# Patient Record
Sex: FEMALE | Race: WHITE | Employment: FULL TIME | URBAN - METROPOLITAN AREA
[De-identification: names, ages, dates, MRNs, and addresses within clinical notes are randomized per-mention and may not be internally consistent; named-entity substitution may affect disease eponyms.]

---

## 2018-01-25 ENCOUNTER — OP HISTORICAL/CONVERTED ENCOUNTER (OUTPATIENT)
Dept: OTHER | Age: 38
End: 2018-01-25

## 2018-02-20 ENCOUNTER — OP HISTORICAL/CONVERTED ENCOUNTER (OUTPATIENT)
Dept: OTHER | Age: 38
End: 2018-02-20

## 2018-02-23 ENCOUNTER — OP HISTORICAL/CONVERTED ENCOUNTER (OUTPATIENT)
Dept: OTHER | Age: 38
End: 2018-02-23

## 2018-03-01 ENCOUNTER — OP HISTORICAL/CONVERTED ENCOUNTER (OUTPATIENT)
Dept: OTHER | Age: 38
End: 2018-03-01

## 2018-03-28 ENCOUNTER — OP HISTORICAL/CONVERTED ENCOUNTER (OUTPATIENT)
Dept: OTHER | Age: 38
End: 2018-03-28

## 2018-04-12 ENCOUNTER — OP HISTORICAL/CONVERTED ENCOUNTER (OUTPATIENT)
Dept: OTHER | Age: 38
End: 2018-04-12

## 2018-05-01 ENCOUNTER — OP HISTORICAL/CONVERTED ENCOUNTER (OUTPATIENT)
Dept: OTHER | Age: 38
End: 2018-05-01

## 2018-08-28 ENCOUNTER — OP HISTORICAL/CONVERTED ENCOUNTER (OUTPATIENT)
Dept: OTHER | Age: 38
End: 2018-08-28

## 2018-09-21 ENCOUNTER — ED HISTORICAL/CONVERTED ENCOUNTER (OUTPATIENT)
Dept: OTHER | Age: 38
End: 2018-09-21

## 2019-02-25 ENCOUNTER — OP HISTORICAL/CONVERTED ENCOUNTER (OUTPATIENT)
Dept: OTHER | Age: 39
End: 2019-02-25

## 2019-08-21 ENCOUNTER — OP HISTORICAL/CONVERTED ENCOUNTER (OUTPATIENT)
Dept: OTHER | Age: 39
End: 2019-08-21

## 2020-11-05 RX ORDER — THYROID, PORCINE 120 MG/1
TABLET ORAL
Qty: 90 TAB | Refills: 0 | OUTPATIENT
Start: 2020-11-05

## 2020-11-09 RX ORDER — LEVOTHYROXINE AND LIOTHYRONINE 76; 18 UG/1; UG/1
120 TABLET ORAL DAILY
Qty: 30 TAB | Refills: 0 | Status: SHIPPED | OUTPATIENT
Start: 2020-11-09 | End: 2020-11-23 | Stop reason: SDUPTHER

## 2020-11-20 PROBLEM — R60.9 EDEMA: Status: ACTIVE | Noted: 2020-11-20

## 2020-11-20 PROBLEM — E55.9 VITAMIN D DEFICIENCY: Status: ACTIVE | Noted: 2020-11-20

## 2020-11-20 PROBLEM — R59.0 CERVICAL LYMPHADENOPATHY: Status: ACTIVE | Noted: 2020-11-20

## 2020-11-20 PROBLEM — E66.9 OBESITY: Status: ACTIVE | Noted: 2020-11-20

## 2020-11-20 PROBLEM — J06.9 URI (UPPER RESPIRATORY INFECTION): Status: ACTIVE | Noted: 2020-11-20

## 2020-11-20 PROBLEM — E03.9 HYPOTHYROIDISM: Status: ACTIVE | Noted: 2020-11-20

## 2020-11-20 PROBLEM — F17.200 TOBACCO DEPENDENCE SYNDROME: Status: ACTIVE | Noted: 2020-11-20

## 2020-11-20 PROBLEM — M72.2 PLANTAR FASCIITIS: Status: ACTIVE | Noted: 2020-11-20

## 2020-11-20 PROBLEM — E04.9 GOITER: Status: ACTIVE | Noted: 2020-11-20

## 2020-11-20 PROBLEM — R53.83 FATIGUE: Status: ACTIVE | Noted: 2020-11-20

## 2020-11-20 PROBLEM — M25.50 MULTIPLE JOINT PAIN: Status: ACTIVE | Noted: 2020-11-20

## 2020-11-23 ENCOUNTER — OFFICE VISIT (OUTPATIENT)
Dept: INTERNAL MEDICINE CLINIC | Age: 40
End: 2020-11-23
Payer: COMMERCIAL

## 2020-11-23 VITALS
HEIGHT: 65 IN | OXYGEN SATURATION: 98 % | WEIGHT: 293 LBS | BODY MASS INDEX: 48.82 KG/M2 | DIASTOLIC BLOOD PRESSURE: 52 MMHG | SYSTOLIC BLOOD PRESSURE: 102 MMHG | RESPIRATION RATE: 18 BRPM | TEMPERATURE: 98.6 F | HEART RATE: 83 BPM

## 2020-11-23 DIAGNOSIS — E03.9 ACQUIRED HYPOTHYROIDISM: Primary | ICD-10-CM

## 2020-11-23 DIAGNOSIS — E66.3 OVERWEIGHT: ICD-10-CM

## 2020-11-23 DIAGNOSIS — R79.89 LOW VITAMIN D LEVEL: ICD-10-CM

## 2020-11-23 DIAGNOSIS — K21.9 GASTROESOPHAGEAL REFLUX DISEASE WITHOUT ESOPHAGITIS: ICD-10-CM

## 2020-11-23 PROCEDURE — 99213 OFFICE O/P EST LOW 20 MIN: CPT | Performed by: INTERNAL MEDICINE

## 2020-11-23 RX ORDER — METHOCARBAMOL 500 MG/1
1 TABLET, FILM COATED ORAL
COMMUNITY

## 2020-11-23 NOTE — PROGRESS NOTES
Candy Tee is a 45 y.o. female and presents with Medication Refill  Patient presents for follow-up not being seen in over a year she was advised to be more compliant she continues on [place] Thyroid 120 mg a day and Prevacid on rare occasions she still has some acid reflux or GERD and will try to lose weight diet exercise which we discussed she is down to 317 pounds she has lost 10 pounds since a year ago she will continue to try to lose labs drawn by the office phlebotomist for those studies below she had a nodule soft nontender movable right neck which she has had in the anterior sternocleidomastoid muscle area which felt to be lymph node sebaceous cyst or lipoma. We will review the old CT scan performed a year or so ago patient says she is not bothered by it it may have been, a little larger unclear- no other cervical adenopathy pain or discomfort  -Last labs from August last year reviewed cholesterol 165 sodium 140 potassium 3.6 creatinine 0.75 glucose 81 normal hepatocellular enzymes TSH 3.7 T4 0.65 vitamin D 14 patient presents for follow-up ambulating with a cane has some osteoarthritis doing well blood pressure 110/60 left arm history of lung carcinoma head neck cancer followed by oncology history of cervical spine disease spinal stenosis on narcotic analgesics tolerating narcotic analgesics well urine toxicology drug screen performed.   Patient also seen in the past by Dr. Tereza Lind for hyper esophagus endoscopy and evaluation for chronic cough which she still maintains but is not very bothersome he does not feel anything is changed weight is about the same he has discontinued cigarettes smoking and alcohol a number of years ago we reviewed his medications he has had a history of DVT in the left leg x2 and is on adjusted dose Coumadin at this time overall doing fairly well         Review of Systems  Constitutional: negative for fevers, chills, anorexia, weight loss and fatigue,no insomnia-overweight eyes:   negative for visual disturbance and irritation, eye discharge, eye pain. no eye redness. ENT:   negative for tinnitus, sore throat, nasal congestion, ear pain, hoarseness, hearing loss.,no snoring. Respiratory:  negative for cough, hemoptysis, shortness of breath, wheezing,  CV:   negative for chest pain, palpitations, lower extremity edema, shortness of breath while sitting, walking or at night  GI:   negative for nausea, vomiting, diarrhea, abdominal pain,melena,constipation. Noted GERD  Endo:               negative for polyuria, polydipsia, polyphagia, cold or heat intolerance,hair loss. Genitourinary: negative for frequency, dysuria and hematuria,urethral discharge,nocturia.straining while urination,urinary incontinence. Integument:  negative for rash and pruritus  Hematologic:  negative for easy bruising and gum/nose bleeding, enlarged nodes  Musculoskel: negative for myalgias, arthralgias, back pain, muscle weakness, joint pain, h/o fall,cramps,calf pain. Neurological:  negative for headaches, dizziness, vertigo, memory problems, gait and seizures loss of consciousness,no ataxia.   Behavl/Psych: negative for feelings of anxiety, depression, mood changes ,sadness    Past Medical History:   Diagnosis Date    Cervical lymphadenopathy 11/20/2020    Edema 11/20/2020    Fatigue 11/20/2020    Goiter 11/20/2020    Hypothyroidism 11/20/2020    Multiple joint pain 11/20/2020    Obesity 11/20/2020    Plantar fasciitis 11/20/2020    Tobacco dependence syndrome 11/20/2020    URI (upper respiratory infection) 11/20/2020    Vitamin D deficiency 11/20/2020     Past Surgical History:   Procedure Laterality Date    HX TONSILLECTOMY       Social History     Socioeconomic History    Marital status:      Spouse name: Not on file    Number of children: Not on file    Years of education: Not on file    Highest education level: Not on file   Tobacco Use    Smoking status: Former Smoker Packs/day: 0.50    Smokeless tobacco: Never Used   Substance and Sexual Activity    Alcohol use: Yes     Comment: Occasional     Family History   Problem Relation Age of Onset    Hypertension Mother     Elevated Lipids Mother      Current Outpatient Medications   Medication Sig Dispense Refill    methocarbamoL (ROBAXIN) 500 mg tablet Take 1 Tab by mouth two (2) times daily as needed.  [place] Thyroid 120 mg tab TAKE 1 TABLET BY MOUTH EVERY DAY 90 Tab 0     No Known Allergies    Objective:  Visit Vitals  BP (!) 102/52 (BP 1 Location: Right arm, BP Patient Position: Sitting)   Pulse 83   Temp 98.6 °F (37 °C) (Oral)   Resp 18   Ht 5' 5\" (1.651 m)   Wt 314 lb 6.4 oz (142.6 kg)   SpO2 98%   BMI 52.32 kg/m²       Physical Exam:   Constitutional: General Appearance: healthy-appearing and obese. Level of Distress: NAD. Ambulation: ambulating normally. Overweight  Psychiatric: Mental Status: normal mood and affect and active and alert. Orientation: to time, place, and person. no agitation. ,normal eye contact. normal insight  Head: Head: normocephalic and atraumatic. Eyes: Pupils: PERRLA. Sclerae: non-icteric. ENMT: No lesions on external ear, no hearing loss. No lesions on external nose, sinus tenderness, or nasal discharge. Lips, Teeth, and no mouth or lip ulcers   Neck: Neck: supple, trachea midline, and no masses. Lymph Nodes: no cervical LAD. Thyroid: no enlargement or nodules and non-tender. Lungs: Respiratory effort: no dyspnea. Auscultation: no wheezing, rales/crackles, or rhonchi and breath sounds normal and good air movement. Cardiovascular: Apical Impulse: not displaced. Heart Auscultation: normal S1 and S2; no murmurs, rubs, or gallops; and RRR. Neck vessels: no carotid bruits. Pulses including femoral / pedal: normal throughout. Abdomen: Bowel Sounds: normal. Inspection and Palpation: no tenderness, guarding, or masses and soft and non-distended. Liver: non-tender and no hepatomegaly.  Spleen: non-tender and no splenomegaly. Musculoskeletal[de-identified] Extremities: no edema or varicosities. Calf tenderness. Neurologic: Gait and Station: normal gait and station. Motor Strength normal right and left. Sensory and cerebellar intact. Skin: Inspection and palpation: no rash, lesions, or ulcer. No results found for this or any previous visit. Assessment/Plan:    ICD-10-CM ICD-9-CM    1. Acquired hypothyroidism  E03.9 244.9 TSH 3RD GENERATION      T4, FREE      CBC WITH AUTOMATED DIFF      METABOLIC PANEL, COMPREHENSIVE   2. Overweight  E66.3 278.02    3. Gastroesophageal reflux disease without esophagitis  K21.9 530.81    4. Low vitamin D level  R79.89 790.6      Orders Placed This Encounter    TSH 3RD GENERATION    T4, FREE    CBC WITH AUTOMATED DIFF    METABOLIC PANEL, COMPREHENSIVE    methocarbamoL (ROBAXIN) 500 mg tablet     Sig: Take 1 Tab by mouth two (2) times daily as needed. routine labs ordered, call if any problems    There are no Patient Instructions on file for this visit. Follow-up and Dispositions    · Return in about 6 months (around 5/23/2021).

## 2020-11-24 LAB
ALBUMIN SERPL-MCNC: 4.3 G/DL (ref 3.8–4.8)
ALBUMIN/GLOB SERPL: 1.5 {RATIO} (ref 1.2–2.2)
ALP SERPL-CCNC: 70 IU/L (ref 39–117)
ALT SERPL-CCNC: 12 IU/L (ref 0–32)
AST SERPL-CCNC: 23 IU/L (ref 0–40)
BASOPHILS # BLD AUTO: NORMAL 10*3/UL
BILIRUB SERPL-MCNC: 0.4 MG/DL (ref 0–1.2)
BUN SERPL-MCNC: 8 MG/DL (ref 6–20)
BUN/CREAT SERPL: 11 (ref 9–23)
CALCIUM SERPL-MCNC: 9.3 MG/DL (ref 8.7–10.2)
CHLORIDE SERPL-SCNC: 103 MMOL/L (ref 96–106)
CO2 SERPL-SCNC: 19 MMOL/L (ref 20–29)
CREAT SERPL-MCNC: 0.71 MG/DL (ref 0.57–1)
EOSINOPHIL # BLD AUTO: NORMAL 10*3/UL
EOSINOPHIL NFR BLD AUTO: NORMAL %
GLOBULIN SER CALC-MCNC: 2.8 G/DL (ref 1.5–4.5)
GLUCOSE SERPL-MCNC: 75 MG/DL (ref 65–99)
HCT VFR BLD AUTO: NORMAL %
HGB BLD-MCNC: NORMAL G/DL
LYMPHOCYTES # BLD AUTO: NORMAL 10*3/UL
LYMPHOCYTES NFR BLD AUTO: NORMAL %
MONOCYTES NFR BLD AUTO: NORMAL %
NEUTROPHILS NFR BLD AUTO: NORMAL %
PLATELET # BLD AUTO: NORMAL 10*3/UL
POTASSIUM SERPL-SCNC: 4.9 MMOL/L (ref 3.5–5.2)
PROT SERPL-MCNC: 7.1 G/DL (ref 6–8.5)
RBC # BLD AUTO: NORMAL 10*6/UL
SODIUM SERPL-SCNC: 140 MMOL/L (ref 134–144)
T4 FREE SERPL-MCNC: 0.72 NG/DL (ref 0.82–1.77)
TSH SERPL DL<=0.005 MIU/L-ACNC: 6.41 UIU/ML (ref 0.45–4.5)
WBC # BLD AUTO: NORMAL X10E3/UL

## 2020-11-25 ENCOUNTER — TELEPHONE (OUTPATIENT)
Dept: INTERNAL MEDICINE CLINIC | Age: 40
End: 2020-11-25

## 2020-11-25 DIAGNOSIS — E03.9 ACQUIRED HYPOTHYROIDISM: Primary | ICD-10-CM

## 2020-11-25 NOTE — TELEPHONE ENCOUNTER
----- Message from Annie Galvez MD sent at 11/25/2020  8:53 AM EST -----    ----- Message -----  From: Aris Vu MD  Sent: 11/24/2020   7:29 PM EST  To: Daphine Homans    Notify the patient thyroid level is low other labs okay if she is taking [place] Thyroid 120 mg will need to increase slightly to the 130 with 146 if she has plenty of the 120s we can add a 15 mg dose to equal 135 so she will not have to throw the pills away she currently uses  ----- Message -----  From: Mariola Londono Lab Results In  Sent: 11/24/2020   3:35 PM EST  To: Annie Galvez MD

## 2020-11-28 DIAGNOSIS — E03.9 ACQUIRED HYPOTHYROIDISM: Primary | ICD-10-CM

## 2020-11-28 RX ORDER — THYROID,PORK 146.25 MG
1 TABLET ORAL DAILY
Qty: 90 TAB | Refills: 0 | Status: SHIPPED | OUTPATIENT
Start: 2020-11-28 | End: 2020-12-20 | Stop reason: RX

## 2020-12-15 ENCOUNTER — TELEPHONE (OUTPATIENT)
Dept: INTERNAL MEDICINE CLINIC | Age: 40
End: 2020-12-15

## 2020-12-15 NOTE — TELEPHONE ENCOUNTER
Ian faxed teofilo [place] Thyroid 146.25 mg tablets dosage not available     Would you like to change it?
PROVIDER:[TOKEN:[[id number]]],PROVIDER:[TOKEN:[[id number]]]

## 2020-12-18 ENCOUNTER — TELEPHONE (OUTPATIENT)
Dept: INTERNAL MEDICINE CLINIC | Age: 40
End: 2020-12-18

## 2020-12-18 NOTE — TELEPHONE ENCOUNTER
The thyroid medicine you sent to pharmacy has been taken off the market. Patient said she will probably have to go back on the medicine she was taking. She said you were going to increase the dose originally. Can you please send this to pharmacy.

## 2020-12-20 DIAGNOSIS — E03.9 HYPOTHYROIDISM, UNSPECIFIED TYPE: Primary | ICD-10-CM

## 2020-12-20 RX ORDER — LEVOTHYROXINE AND LIOTHYRONINE 9.5; 2.25 UG/1; UG/1
15 TABLET ORAL DAILY
Qty: 90 TAB | Refills: 0 | Status: SHIPPED | OUTPATIENT
Start: 2020-12-20 | End: 2021-01-14 | Stop reason: SDUPTHER

## 2020-12-20 RX ORDER — THYROID, PORCINE 120 MG/1
1 TABLET ORAL DAILY
Qty: 90 TAB | Refills: 0 | Status: SHIPPED | OUTPATIENT
Start: 2020-12-20 | End: 2021-01-14 | Stop reason: SDUPTHER

## 2021-01-14 RX ORDER — LEVOTHYROXINE AND LIOTHYRONINE 9.5; 2.25 UG/1; UG/1
15 TABLET ORAL DAILY
Qty: 90 TAB | Refills: 1 | Status: SHIPPED | OUTPATIENT
Start: 2021-01-14 | End: 2021-07-13

## 2021-01-14 RX ORDER — THYROID, PORCINE 120 MG/1
1 TABLET ORAL DAILY
Qty: 90 TAB | Refills: 1 | Status: SHIPPED | OUTPATIENT
Start: 2021-01-14 | End: 2021-07-13

## 2021-05-11 ENCOUNTER — HOSPITAL ENCOUNTER (EMERGENCY)
Age: 41
Discharge: HOME | End: 2021-05-11
Attending: EMERGENCY MEDICINE
Payer: COMMERCIAL

## 2021-05-11 ENCOUNTER — APPOINTMENT (OUTPATIENT)
Dept: GENERAL RADIOLOGY | Age: 41
End: 2021-05-11
Attending: EMERGENCY MEDICINE
Payer: COMMERCIAL

## 2021-05-11 VITALS
OXYGEN SATURATION: 99 % | HEIGHT: 65 IN | RESPIRATION RATE: 18 BRPM | TEMPERATURE: 98.6 F | DIASTOLIC BLOOD PRESSURE: 76 MMHG | BODY MASS INDEX: 48.82 KG/M2 | SYSTOLIC BLOOD PRESSURE: 114 MMHG | HEART RATE: 88 BPM | WEIGHT: 293 LBS

## 2021-05-11 DIAGNOSIS — T78.40XA ALLERGIC REACTION, INITIAL ENCOUNTER: Primary | ICD-10-CM

## 2021-05-11 LAB
ALBUMIN SERPL-MCNC: 3.4 G/DL (ref 3.5–5)
ALBUMIN/GLOB SERPL: 0.9 {RATIO} (ref 1.1–2.2)
ALP SERPL-CCNC: 68 U/L (ref 45–117)
ALT SERPL-CCNC: 19 U/L (ref 12–78)
ANION GAP SERPL CALC-SCNC: 8 MMOL/L (ref 5–15)
AST SERPL W P-5'-P-CCNC: 9 U/L (ref 15–37)
ATRIAL RATE: 99 BPM
BASOPHILS # BLD: 0 K/UL (ref 0–0.1)
BASOPHILS NFR BLD: 0 % (ref 0–1)
BILIRUB SERPL-MCNC: 0.2 MG/DL (ref 0.2–1)
BUN SERPL-MCNC: 12 MG/DL (ref 6–20)
BUN/CREAT SERPL: 13 (ref 12–20)
CA-I BLD-MCNC: 8.8 MG/DL (ref 8.5–10.1)
CALCULATED P AXIS, ECG09: 46 DEGREES
CALCULATED R AXIS, ECG10: 23 DEGREES
CALCULATED T AXIS, ECG11: 20 DEGREES
CHLORIDE SERPL-SCNC: 108 MMOL/L (ref 97–108)
CO2 SERPL-SCNC: 23 MMOL/L (ref 21–32)
CREAT SERPL-MCNC: 0.93 MG/DL (ref 0.55–1.02)
D DIMER PPP FEU-MCNC: 0.38 UG/ML(FEU)
DIAGNOSIS, 93000: NORMAL
DIFFERENTIAL METHOD BLD: ABNORMAL
EOSINOPHIL # BLD: 0.1 K/UL (ref 0–0.4)
EOSINOPHIL NFR BLD: 2 % (ref 0–7)
ERYTHROCYTE [DISTWIDTH] IN BLOOD BY AUTOMATED COUNT: 13.1 % (ref 11.5–14.5)
GLOBULIN SER CALC-MCNC: 3.7 G/DL (ref 2–4)
GLUCOSE SERPL-MCNC: 117 MG/DL (ref 65–100)
HCT VFR BLD AUTO: 44 % (ref 35–47)
HGB BLD-MCNC: 14.3 G/DL (ref 11.5–16)
IMM GRANULOCYTES # BLD AUTO: 0 K/UL (ref 0–0.04)
IMM GRANULOCYTES NFR BLD AUTO: 0 % (ref 0–0.5)
LIPASE SERPL-CCNC: 80 U/L (ref 73–393)
LYMPHOCYTES # BLD: 2.7 K/UL (ref 0.8–3.5)
LYMPHOCYTES NFR BLD: 29 % (ref 12–49)
MCH RBC QN AUTO: 30.3 PG (ref 26–34)
MCHC RBC AUTO-ENTMCNC: 32.5 G/DL (ref 30–36.5)
MCV RBC AUTO: 93.2 FL (ref 80–99)
MONOCYTES # BLD: 0.4 K/UL (ref 0–1)
MONOCYTES NFR BLD: 4 % (ref 5–13)
NEUTS SEG # BLD: 5.8 K/UL (ref 1.8–8)
NEUTS SEG NFR BLD: 65 % (ref 32–75)
NRBC # BLD: 0 K/UL (ref 0–0.01)
NRBC BLD-RTO: 0 PER 100 WBC
P-R INTERVAL, ECG05: 130 MS
PLATELET # BLD AUTO: 285 K/UL (ref 150–400)
PMV BLD AUTO: 12.9 FL (ref 8.9–12.9)
POTASSIUM SERPL-SCNC: 3.4 MMOL/L (ref 3.5–5.1)
PROT SERPL-MCNC: 7.1 G/DL (ref 6.4–8.2)
Q-T INTERVAL, ECG07: 342 MS
QRS DURATION, ECG06: 68 MS
QTC CALCULATION (BEZET), ECG08: 438 MS
RBC # BLD AUTO: 4.72 M/UL (ref 3.8–5.2)
SODIUM SERPL-SCNC: 139 MMOL/L (ref 136–145)
TROPONIN I SERPL-MCNC: <0.05 NG/ML
VENTRICULAR RATE, ECG03: 99 BPM
WBC # BLD AUTO: 9.1 K/UL (ref 3.6–11)

## 2021-05-11 PROCEDURE — 93005 ELECTROCARDIOGRAM TRACING: CPT

## 2021-05-11 PROCEDURE — 74011250636 HC RX REV CODE- 250/636: Performed by: EMERGENCY MEDICINE

## 2021-05-11 PROCEDURE — 80053 COMPREHEN METABOLIC PANEL: CPT

## 2021-05-11 PROCEDURE — 99284 EMERGENCY DEPT VISIT MOD MDM: CPT

## 2021-05-11 PROCEDURE — 71045 X-RAY EXAM CHEST 1 VIEW: CPT

## 2021-05-11 PROCEDURE — 84484 ASSAY OF TROPONIN QUANT: CPT

## 2021-05-11 PROCEDURE — 85379 FIBRIN DEGRADATION QUANT: CPT

## 2021-05-11 PROCEDURE — 85025 COMPLETE CBC W/AUTO DIFF WBC: CPT

## 2021-05-11 PROCEDURE — 83690 ASSAY OF LIPASE: CPT

## 2021-05-11 PROCEDURE — 36415 COLL VENOUS BLD VENIPUNCTURE: CPT

## 2021-05-11 RX ORDER — DIPHENHYDRAMINE HYDROCHLORIDE 50 MG/ML
25 INJECTION, SOLUTION INTRAMUSCULAR; INTRAVENOUS
Status: COMPLETED | OUTPATIENT
Start: 2021-05-11 | End: 2021-05-11

## 2021-05-11 RX ORDER — PREDNISONE 50 MG/1
50 TABLET ORAL DAILY
Qty: 3 TAB | Refills: 0 | Status: SHIPPED | OUTPATIENT
Start: 2021-05-11 | End: 2021-05-14

## 2021-05-11 RX ADMIN — DIPHENHYDRAMINE HYDROCHLORIDE 25 MG: 50 INJECTION, SOLUTION INTRAMUSCULAR; INTRAVENOUS at 03:44

## 2021-05-11 RX ADMIN — METHYLPREDNISOLONE SODIUM SUCCINATE 125 MG: 40 INJECTION, POWDER, FOR SOLUTION INTRAMUSCULAR; INTRAVENOUS at 03:47

## 2021-05-11 RX ADMIN — FAMOTIDINE 20 MG: 10 INJECTION, SOLUTION INTRAVENOUS at 03:44

## 2021-05-11 NOTE — ED PROVIDER NOTES
EMERGENCY DEPARTMENT HISTORY AND PHYSICAL EXAM      Date: 5/11/2021  Patient Name: Catrachito Ng      History of Presenting Illness     Chief Complaint   Patient presents with    Allergic Reaction       History Provided By: Patient    HPI: Catrachito Ng, 45 y.o. female with a past medical history significant Thyroid and obesity presents to the ED with cc of acute onset of allergic reaction with a chest tightness. Patient is one of the hospital nurse. She said while she was working on the floor all of a sudden she developed redness in her arms and other part of her body. The area started to itching. She is not sure if she touch any chemical or if she was exposed to any thing else. Patient denies shortness of breath or wheezing. However she complains of chest tightness with onset of the rash and itchiness. No other complaints at this time. There are no other complaints, changes, or physical findings at this time. PCP: Kymberly Ware MD    Current Outpatient Medications   Medication Sig Dispense Refill    predniSONE (DELTASONE) 50 mg tablet Take 1 Tab by mouth daily for 3 days. 3 Tab 0    Thyroid, Pork, ([place] Thyroid) 120 mg tab Take 1 Tab by mouth daily for 180 days. 90 Tab 1    thyroid, [place], ([place] Thyroid) 15 mg tablet Take 1 Tab by mouth daily for 180 days. 90 Tab 1    methocarbamoL (ROBAXIN) 500 mg tablet Take 1 Tab by mouth two (2) times daily as needed.          Past History     Past Medical History:  Past Medical History:   Diagnosis Date    Cervical lymphadenopathy 11/20/2020    Edema 11/20/2020    Fatigue 11/20/2020    Goiter 11/20/2020    Hypothyroidism 11/20/2020    Multiple joint pain 11/20/2020    Obesity 11/20/2020    Plantar fasciitis 11/20/2020    Tobacco dependence syndrome 11/20/2020    URI (upper respiratory infection) 11/20/2020    Vitamin D deficiency 11/20/2020       Past Surgical History:  Past Surgical History:   Procedure Laterality Date    HX TONSILLECTOMY         Family History:  Family History   Problem Relation Age of Onset    Hypertension Mother     Elevated Lipids Mother        Social History:  Social History     Tobacco Use    Smoking status: Former Smoker     Packs/day: 0.50    Smokeless tobacco: Never Used   Substance Use Topics    Alcohol use: Yes     Comment: Occasional    Drug use: Not on file       Allergies:  No Known Allergies      Review of Systems     Review of Systems   Constitutional: Negative. HENT: Negative. Eyes: Negative. Respiratory: Positive for chest tightness. Cardiovascular: Negative. Gastrointestinal: Negative. Endocrine: Negative. Genitourinary: Negative. Musculoskeletal: Negative. Skin: Positive for rash. Allergic/Immunologic: Negative. Neurological: Negative. Psychiatric/Behavioral: Negative. All other systems reviewed and are negative. Physical Exam     Physical Exam  Vitals signs and nursing note reviewed. Constitutional:       General: She is not in acute distress. Appearance: Normal appearance. She is normal weight. She is not ill-appearing or diaphoretic. HENT:      Head: Normocephalic. Right Ear: External ear normal.      Left Ear: External ear normal.      Nose: Nose normal. No congestion or rhinorrhea. Mouth/Throat:      Pharynx: Oropharynx is clear. No oropharyngeal exudate or posterior oropharyngeal erythema. Eyes:      General: No scleral icterus. Right eye: No discharge. Left eye: No discharge. Extraocular Movements: Extraocular movements intact. Conjunctiva/sclera: Conjunctivae normal.      Pupils: Pupils are equal, round, and reactive to light. Neck:      Musculoskeletal: Normal range of motion and neck supple. No neck rigidity or muscular tenderness. Cardiovascular:      Rate and Rhythm: Normal rate and regular rhythm. Pulses: Normal pulses. Heart sounds: Normal heart sounds. No murmur.    Pulmonary: Effort: Pulmonary effort is normal. No respiratory distress. Breath sounds: Normal breath sounds. No stridor. No wheezing, rhonchi or rales. Chest:      Chest wall: No tenderness. Abdominal:      General: Abdomen is flat. Bowel sounds are normal. There is no distension. Palpations: Abdomen is soft. Tenderness: There is no abdominal tenderness. There is no right CVA tenderness, left CVA tenderness, guarding or rebound. Musculoskeletal: Normal range of motion. General: No swelling, tenderness, deformity or signs of injury. Right lower leg: No edema. Left lower leg: No edema. Skin:     General: Skin is warm. Capillary Refill: Capillary refill takes less than 2 seconds. Coloration: Skin is not jaundiced or pale. Findings: Erythema and rash present. No bruising or lesion. Neurological:      General: No focal deficit present. Mental Status: She is alert and oriented to person, place, and time. Mental status is at baseline. Cranial Nerves: No cranial nerve deficit. Sensory: No sensory deficit. Motor: No weakness. Coordination: Coordination normal.   Psychiatric:         Mood and Affect: Mood normal.         Behavior: Behavior normal.         Thought Content:  Thought content normal.         Judgment: Judgment normal.         Lab and Diagnostic Study Results     Labs -     Recent Results (from the past 12 hour(s))   CBC WITH AUTOMATED DIFF    Collection Time: 05/11/21  4:10 AM   Result Value Ref Range    WBC 9.1 3.6 - 11.0 K/uL    RBC 4.72 3.80 - 5.20 M/uL    HGB 14.3 11.5 - 16.0 g/dL    HCT 44.0 35.0 - 47.0 %    MCV 93.2 80.0 - 99.0 FL    MCH 30.3 26.0 - 34.0 PG    MCHC 32.5 30.0 - 36.5 g/dL    RDW 13.1 11.5 - 14.5 %    PLATELET 407 069 - 055 K/uL    MPV 12.9 8.9 - 12.9 FL    NRBC 0.0 0.0  WBC    ABSOLUTE NRBC 0.00 0.00 - 0.01 K/uL    NEUTROPHILS 65 32 - 75 %    LYMPHOCYTES 29 12 - 49 %    MONOCYTES 4 (L) 5 - 13 %    EOSINOPHILS 2 0 - 7 %    BASOPHILS 0 0 - 1 %    IMMATURE GRANULOCYTES 0 0 - 0.5 %    ABS. NEUTROPHILS 5.8 1.8 - 8.0 K/UL    ABS. LYMPHOCYTES 2.7 0.8 - 3.5 K/UL    ABS. MONOCYTES 0.4 0.0 - 1.0 K/UL    ABS. EOSINOPHILS 0.1 0.0 - 0.4 K/UL    ABS. BASOPHILS 0.0 0.0 - 0.1 K/UL    ABS. IMM. GRANS. 0.0 0.00 - 0.04 K/UL    DF AUTOMATED     METABOLIC PANEL, COMPREHENSIVE    Collection Time: 05/11/21  4:10 AM   Result Value Ref Range    Sodium 139 136 - 145 mmol/L    Potassium 3.4 (L) 3.5 - 5.1 mmol/L    Chloride 108 97 - 108 mmol/L    CO2 23 21 - 32 mmol/L    Anion gap 8 5 - 15 mmol/L    Glucose 117 (H) 65 - 100 mg/dL    BUN 12 6 - 20 mg/dL    Creatinine 0.93 0.55 - 1.02 mg/dL    BUN/Creatinine ratio 13 12 - 20      GFR est AA >60 >60 ml/min/1.73m2    GFR est non-AA >60 >60 ml/min/1.73m2    Calcium 8.8 8.5 - 10.1 mg/dL    Bilirubin, total 0.2 0.2 - 1.0 mg/dL    AST (SGOT) 9 (L) 15 - 37 U/L    ALT (SGPT) 19 12 - 78 U/L    Alk. phosphatase 68 45 - 117 U/L    Protein, total 7.1 6.4 - 8.2 g/dL    Albumin 3.4 (L) 3.5 - 5.0 g/dL    Globulin 3.7 2.0 - 4.0 g/dL    A-G Ratio 0.9 (L) 1.1 - 2.2     D DIMER    Collection Time: 05/11/21  4:10 AM   Result Value Ref Range    D DIMER 0.38 <0.50 ug/ml(FEU)   LIPASE    Collection Time: 05/11/21  4:10 AM   Result Value Ref Range    Lipase 80 73 - 393 U/L   TROPONIN I    Collection Time: 05/11/21  4:10 AM   Result Value Ref Range    Troponin-I, Qt. <0.05 <0.05 ng/mL       Radiologic Studies -   [unfilled]  CT Results  (Last 48 hours)    None        CXR Results  (Last 48 hours)               05/11/21 0413  XR CHEST PORT Final result    Impression:  No acute cardiopulmonary abnormality. Narrative:  EXAMINATION: XR CHEST PORT       HISTORY: Chest pain. COMPARISON: None available. FINDINGS: Single view. The lungs are clear. There is no pneumothorax or pleural   effusion.  Heart size and mediastinal contours are normal.                 Medical Decision Making and ED Course   - I am the first and primary provider for this patient AND AM THE PRIMARY PROVIDER OF RECORD. - I reviewed the vital signs, available nursing notes, past medical history, past surgical history, family history and social history. - Initial assessment performed. The patients presenting problems have been discussed, and the staff are in agreement with the care plan formulated and outlined with them. I have encouraged them to ask questions as they arise throughout their visit. Vital Signs-Reviewed the patient's vital signs. Patient Vitals for the past 12 hrs:   Temp Pulse Resp BP SpO2   05/11/21 0527  88  114/76 99 %   05/11/21 0342  (!) 102  (!) 121/96 98 %   05/11/21 0328 98.6 °F (37 °C) (!) 111 18 (!) 141/96 98 %       EKG interpretation: (Preliminary): EKG was done at 3:38 AM.  Normal sinus rhythm. Rate of 99. Normal axis. No acute ST-T elevation. No STEMI. No old EKG available for comparison at this time. Records Reviewed: Nursing Notes        ED Course:              Provider Notes (Medical Decision Making):     MDM  Number of Diagnoses or Management Options  Allergic reaction, initial encounter: new, needed workup  Diagnosis management comments: Differential diagnosis include allergic reaction, contact dermatitis, urticaria, chest tightness, pneumothorax, pneumonia, bronchitis,       Amount and/or Complexity of Data Reviewed  Clinical lab tests: ordered and reviewed  Tests in the radiology section of CPT®: ordered and reviewed  Tests in the medicine section of CPT®: reviewed and ordered  Independent visualization of images, tracings, or specimens: yes (EKG was done at 3:38 AM.  Normal sinus rhythm. Rate of 99. Normal axis. No acute ST-T elevation. No STEMI. No old EKG available for comparison at this time.)    Risk of Complications, Morbidity, and/or Mortality  General comments: Patient remained stable throughout the course of treatment.   Patient was given Benadryl IV, Pepcid IV, Solu-Medrol IV with good results. Since patient was having chest tightness decision was made to order blood test on patient's. Cardiac work-up was negative. Troponin was negative. I believe her chest tightness was due to the allergic reaction since after giving the medicine the chest tightness was gone. Will discharge patient home on prednisone p.o. Patient was also advised to take over-the-counter Benadryl and Pepcid as needed. Patient understood and agree with her management. Consultations:       Consultations:         Procedures and Critical Care       Performed by: Marleni Packer DO  PROCEDURES:  Procedures               CRITICAL CARE NOTE :  3:57 AM  Amount of Critical Care Time: (minutes)    IMPENDING DETERIORATION -  ASSOCIATED RISK FACTORS -   MANAGEMENT-   INTERPRETATION -    INTERVENTIONS -   CASE REVIEW -   TREATMENT RESPONSE -  PERFORMED BY -     NOTES   :  I have spent critical care time involved in lab review, consultations with specialist, family decision- making, bedside attention and documentation. This time excludes time spent in any separate billed procedures. During this entire length of time I was immediately available to the patient . Marleni Packer DO        Disposition     Disposition: Condition stable and improved    Discharged    Remove if not discharged  DISCHARGE PLAN:  1. Current Discharge Medication List      CONTINUE these medications which have NOT CHANGED    Details   !! Thyroid, [place], ([place] Thyroid) 120 mg tab Take 1 Tab by mouth daily for 180 days. Qty: 90 Tab, Refills: 1      !! thyroid, [place], ([place] Thyroid) 15 mg tablet Take 1 Tab by mouth daily for 180 days. Qty: 90 Tab, Refills: 1      methocarbamoL (ROBAXIN) 500 mg tablet Take 1 Tab by mouth two (2) times daily as needed. !! - Potential duplicate medications found. Please discuss with provider.         2.   Follow-up Information     Follow up With Specialties Details Why Contact Info    Mike Emperatriz Garcia MD Internal Medicine Schedule an appointment as soon as possible for a visit in 1 day  [address] [facility] [address] [place] [address]  [phone]          3. Return to ED if worse   4. Current Discharge Medication List      START taking these medications    Details   predniSONE (DELTASONE) 50 mg tablet Take 1 Tab by mouth daily for 3 days. Qty: 3 Tab, Refills: 0  Start date: 5/11/2021, End date: 5/14/2021             Diagnosis     Clinical Impression:   1. Allergic reaction, initial encounter        Attestations:    Carito Diop, DO    Please note that this dictation was completed with medineering, the computer voice recognition software. Quite often unanticipated grammatical, syntax, homophones, and other interpretive errors are inadvertently transcribed by the computer software. Please disregard these errors. Please excuse any errors that have escaped final proofreading. Thank you.

## 2021-09-17 ENCOUNTER — TELEPHONE (OUTPATIENT)
Dept: INTERNAL MEDICINE CLINIC | Age: 41
End: 2021-09-17

## 2021-09-17 RX ORDER — THYROID, PORCINE 120 MG/1
1 TABLET ORAL DAILY
Qty: 90 TABLET | Refills: 0 | Status: SHIPPED | OUTPATIENT
Start: 2021-09-17 | End: 2021-12-16

## 2021-09-17 RX ORDER — THYROID, PORCINE 120 MG/1
120 TABLET ORAL DAILY
Qty: 30 TABLET | Refills: 0 | Status: SHIPPED | OUTPATIENT
Start: 2021-09-17 | End: 2021-10-17

## 2021-09-17 RX ORDER — LEVOTHYROXINE AND LIOTHYRONINE 9.5; 2.25 UG/1; UG/1
15 TABLET ORAL DAILY
Qty: 90 TABLET | Refills: 0 | Status: SHIPPED | OUTPATIENT
Start: 2021-09-17 | End: 2021-11-19 | Stop reason: DRUGHIGH

## 2021-09-17 RX ORDER — LEVOTHYROXINE AND LIOTHYRONINE 9.5; 2.25 UG/1; UG/1
15 TABLET ORAL DAILY
Qty: 30 TABLET | Refills: 0 | Status: SHIPPED | OUTPATIENT
Start: 2021-09-17 | End: 2021-10-17

## 2021-09-17 NOTE — TELEPHONE ENCOUNTER
Patient called to schedule a f/u appt and to request refills on her thyroid medications. Pt is requesting     30 day to be sent to [place] [id number] and the     90 day to be sent to [facility]. Patient scheduled Appt on 11/16/21.

## 2021-11-16 ENCOUNTER — OFFICE VISIT (OUTPATIENT)
Dept: INTERNAL MEDICINE CLINIC | Age: 41
End: 2021-11-16
Payer: COMMERCIAL

## 2021-11-16 VITALS
WEIGHT: 293 LBS | HEART RATE: 107 BPM | SYSTOLIC BLOOD PRESSURE: 134 MMHG | DIASTOLIC BLOOD PRESSURE: 86 MMHG | TEMPERATURE: 98.2 F | BODY MASS INDEX: 48.82 KG/M2 | OXYGEN SATURATION: 98 % | HEIGHT: 65 IN | RESPIRATION RATE: 18 BRPM

## 2021-11-16 DIAGNOSIS — E66.3 OVERWEIGHT: ICD-10-CM

## 2021-11-16 DIAGNOSIS — R60.0 LOCALIZED EDEMA: ICD-10-CM

## 2021-11-16 DIAGNOSIS — M25.50 MULTIPLE JOINT PAIN: ICD-10-CM

## 2021-11-16 DIAGNOSIS — M62.838 MUSCLE SPASM: ICD-10-CM

## 2021-11-16 DIAGNOSIS — R53.83 OTHER FATIGUE: ICD-10-CM

## 2021-11-16 DIAGNOSIS — E03.9 ACQUIRED HYPOTHYROIDISM: Primary | ICD-10-CM

## 2021-11-16 DIAGNOSIS — E55.9 VITAMIN D DEFICIENCY: ICD-10-CM

## 2021-11-16 PROBLEM — E53.8 B12 DEFICIENCY: Status: ACTIVE | Noted: 2021-11-16

## 2021-11-16 PROCEDURE — 99214 OFFICE O/P EST MOD 30 MIN: CPT | Performed by: INTERNAL MEDICINE

## 2021-11-16 RX ORDER — DICLOFENAC SODIUM 50 MG/1
50 TABLET, DELAYED RELEASE ORAL 2 TIMES DAILY
Qty: 60 TABLET | Refills: 1 | Status: SHIPPED | OUTPATIENT
Start: 2021-11-16 | End: 2022-01-15

## 2021-11-16 NOTE — PROGRESS NOTES
Chief Complaint   Patient presents with    Follow-up    Thyroid Problem     Visit Vitals  /86 (BP 1 Location: Right arm, BP Patient Position: Sitting, BP Cuff Size: Large adult)   Pulse (!) 107   Temp 98.2 °F (36.8 °C) (Oral)   Resp 18   Ht 5' 5\" (1.651 m)   Wt 324 lb (147 kg)   SpO2 98%   BMI 53.92 kg/m²       1. Have you been to the ER, urgent care clinic since your last visit? Hospitalized since your last visit? No    2. Have you seen or consulted any other health care providers outside of the [address] [place] [address] since your last visit? Include any pap smears or colon screening.  No

## 2021-11-16 NOTE — PROGRESS NOTES
Keshia Harley is a 37 y.o. female and presents with Follow-up and Thyroid Problem  Patient presents for follow-up has not been too successful losing weight she was concerned at the age of 37 and does not know why she tries to eat right and exercise she has received the [place] vaccine x2 blood pressure today 120/80 right arm last labs November of last year yielded a glucose of 75 creatinine 0.71 potassium 4.9 AST 23 TSH slightly up at 6.4 with free T4 0.72 she was agreeable for having drawn blood today for those studies below she is on thyroid replacement for hypothyroidism history of gastroesophageal reflux disease and vitamin D deficiency we discussed diet exercise anorectic medications use as an adjunct she has tried various medications overall but was agreeable for Saxenda shot escalating dose daily she is aware that there was concerns during the studies of thyroid malignancy or pancreatitis she has had no problems with either and will monitor she knows that this may lower blood sugar somewhat to she will slowly escalate the dose but stop at the 2.4 mg dose, occasional anterior left leg back pain or discomfort probably secondary to overweight its been a problem off and on for years-she says when she goes to work she has to take 6-8 Naprosyn. We will switch to diclofenac 50 mg twice a day she did not want imaging studies we discussed preventive care issues we discussed coronavirus vaccine         Review of Systems  Constitutional: negative for fevers, chills, anorexia, weight loss and noted fatigue,no insomnia  Eyes:   negative for visual disturbance and irritation, eye discharge, eye pain. no eye redness. ENT:   negative for tinnitus, sore throat, nasal congestion, ear pain, hoarseness, hearing loss.,no snoring.   Respiratory:  negative for cough, hemoptysis, shortness of breath, wheezing,  CV:   negative for chest pain, palpitations, edema lower extremity edema, shortness of breath while sitting, walking or at night  GI:   negative for nausea, vomiting, diarrhea, abdominal pain,melena,constipation. Endo:               negative for polyuria, polydipsia, polyphagia, cold or heat intolerance,hair loss. Genitourinary: negative for frequency, dysuria and hematuria,urethral discharge,nocturia.straining while urination,urinary incontinence. Integument:  negative for rash and pruritus  Hematologic:  negative for easy bruising and gum/nose bleeding, enlarged nodes  Musculoskel: negative for myalgias, arthralgias, back pain, muscle weakness, joint pain, h/o fall,cramps,calf pain. DJD  Neurological:  negative for headaches, dizziness, vertigo, memory problems, gait and seizures loss of consciousness,no ataxia. Behavl/Psych: negative for feelings of anxiety, depression, mood changes ,sadness    Past Medical History:   Diagnosis Date    Cervical lymphadenopathy 11/20/2020    Edema 11/20/2020    Fatigue 11/20/2020    Goiter 11/20/2020    Hypothyroidism 11/20/2020    Multiple joint pain 11/20/2020    Obesity 11/20/2020    Plantar fasciitis 11/20/2020    Tobacco dependence syndrome 11/20/2020    URI (upper respiratory infection) 11/20/2020    Vitamin D deficiency 11/20/2020     Past Surgical History:   Procedure Laterality Date    HX TONSILLECTOMY       Social History     Socioeconomic History    Marital status:    Tobacco Use    Smoking status: Former Smoker     Packs/day: 0.50    Smokeless tobacco: Never Used   Substance and Sexual Activity    Alcohol use: Yes     Comment: Occasional     Family History   Problem Relation Age of Onset    Hypertension Mother     Elevated Lipids Mother      Current Outpatient Medications   Medication Sig Dispense Refill    diclofenac EC (VOLTAREN) 50 mg EC tablet Take 1 Tablet by mouth two (2) times a day for 60 days.  60 Tablet 1    liraglutide, weight loss, (SAXENDA) 3 mg/0.5 mL (18 mg/3 mL) pen Week 1 take 0.6 mg subcu daily -then week 2 take 1.2 mg subcu daily- then week 3 1.8 mg daily- then week for 2.4 mg subcu daily and thereafter 1 Each 1    Thyroid, Pork, ([place] Thyroid) 120 mg tab Take 1 Tablet by mouth daily for 90 days. 90 Tablet 0    thyroid, Pork, ([place] Thyroid) 15 mg tablet Take 1 Tablet by mouth daily for 90 days. 90 Tablet 0    methocarbamoL (ROBAXIN) 500 mg tablet Take 1 Tab by mouth two (2) times daily as needed. No Known Allergies    Objective:  Visit Vitals  /86 (BP 1 Location: Right arm, BP Patient Position: Sitting, BP Cuff Size: Large adult)   Pulse (!) 107   Temp 98.2 °F (36.8 °C) (Oral)   Resp 18   Ht 5' 5\" (1.651 m)   Wt 324 lb (147 kg)   SpO2 98%   BMI 53.92 kg/m²       Physical Exam:   Constitutional: General Appearance: healthy-appearing and obese. Level of Distress: NAD. Ambulation: ambulating normally. Psychiatric: Mental Status: normal mood and affect and active and alert. Orientation: to time, place, and person. no agitation. ,normal eye contact. normal insight  Head: Head: normocephalic and atraumatic. Eyes: Pupils: PERRLA. Sclerae: non-icteric. ENMT: No lesions on external ear, no hearing loss. No lesions on external nose, sinus tenderness, or nasal discharge. Lips, Teeth, and no mouth or lip ulcers   Neck: Neck: supple, trachea midline, and no masses. Lymph Nodes: no cervical LAD. Thyroid: no enlargement or nodules and non-tender. Lungs: Respiratory effort: no dyspnea. Auscultation: no wheezing, rales/crackles, or rhonchi and breath sounds normal and good air movement. Cardiovascular: Apical Impulse: not displaced. Heart Auscultation: normal S1 and S2; no murmurs, rubs, or gallops; and RRR. Neck vessels: no carotid bruits. Pulses including femoral / pedal: normal throughout. Abdomen: Bowel Sounds: normal. Inspection and Palpation: no tenderness, guarding, or masses and soft and non-distended. Liver: non-tender and no hepatomegaly  Musculoskeletal[de-identified] Extremities: no edema or varicosities. Calf tenderness. DJD  Neurologic: Gait and Station: normal gait and station. Motor Strength normal right and left. Sensory and cerebellar intact. Skin: Inspection and palpation: no rash, lesions, or ulcer. Leg edema noted      Results for orders placed or performed during the hospital encounter of 05/11/21   CBC WITH AUTOMATED DIFF   Result Value Ref Range    WBC 9.1 3.6 - 11.0 K/uL    RBC 4.72 3.80 - 5.20 M/uL    HGB 14.3 11.5 - 16.0 g/dL    HCT 44.0 35.0 - 47.0 %    MCV 93.2 80.0 - 99.0 FL    MCH 30.3 26.0 - 34.0 PG    MCHC 32.5 30.0 - 36.5 g/dL    RDW 13.1 11.5 - 14.5 %    PLATELET 418 667 - 002 K/uL    MPV 12.9 8.9 - 12.9 FL    NRBC 0.0 0.0  WBC    ABSOLUTE NRBC 0.00 0.00 - 0.01 K/uL    NEUTROPHILS 65 32 - 75 %    LYMPHOCYTES 29 12 - 49 %    MONOCYTES 4 (L) 5 - 13 %    EOSINOPHILS 2 0 - 7 %    BASOPHILS 0 0 - 1 %    IMMATURE GRANULOCYTES 0 0 - 0.5 %    ABS. NEUTROPHILS 5.8 1.8 - 8.0 K/UL    ABS. LYMPHOCYTES 2.7 0.8 - 3.5 K/UL    ABS. MONOCYTES 0.4 0.0 - 1.0 K/UL    ABS. EOSINOPHILS 0.1 0.0 - 0.4 K/UL    ABS. BASOPHILS 0.0 0.0 - 0.1 K/UL    ABS. IMM. GRANS. 0.0 0.00 - 0.04 K/UL    DF AUTOMATED     METABOLIC PANEL, COMPREHENSIVE   Result Value Ref Range    Sodium 139 136 - 145 mmol/L    Potassium 3.4 (L) 3.5 - 5.1 mmol/L    Chloride 108 97 - 108 mmol/L    CO2 23 21 - 32 mmol/L    Anion gap 8 5 - 15 mmol/L    Glucose 117 (H) 65 - 100 mg/dL    BUN 12 6 - 20 mg/dL    Creatinine 0.93 0.55 - 1.02 mg/dL    BUN/Creatinine ratio 13 12 - 20      GFR est AA >60 >60 ml/min/1.73m2    GFR est non-AA >60 >60 ml/min/1.73m2    Calcium 8.8 8.5 - 10.1 mg/dL    Bilirubin, total 0.2 0.2 - 1.0 mg/dL    AST (SGOT) 9 (L) 15 - 37 U/L    ALT (SGPT) 19 12 - 78 U/L    Alk.  phosphatase 68 45 - 117 U/L    Protein, total 7.1 6.4 - 8.2 g/dL    Albumin 3.4 (L) 3.5 - 5.0 g/dL    Globulin 3.7 2.0 - 4.0 g/dL    A-G Ratio 0.9 (L) 1.1 - 2.2     D DIMER   Result Value Ref Range    D DIMER 0.38 <0.50 ug/ml(FEU)   LIPASE   Result Value Ref Range    Lipase 80 73 - 393 U/L   TROPONIN I   Result Value Ref Range    Troponin-I, Qt. <0.05 <0.05 ng/mL   EKG, 12 LEAD, INITIAL   Result Value Ref Range    Ventricular Rate 99 BPM    Atrial Rate 99 BPM    P-R Interval 130 ms    QRS Duration 68 ms    Q-T Interval 342 ms    QTC Calculation (Bezet) 438 ms    Calculated P Axis 46 degrees    Calculated R Axis 23 degrees    Calculated T Axis 20 degrees    Diagnosis       Normal sinus rhythm  Cannot rule out Anterior infarct , age undetermined  Abnormal ECG  No previous ECGs available  Confirmed by EVER SCHMIDT, Vicki Gomez ([id number]) on 5/11/2021 11:07:42 AM         Assessment/Plan:    ICD-10-CM ICD-9-CM    1. Acquired hypothyroidism  E03.9 244.9 T4, FREE      T3, FREE      TSH 3RD GENERATION   2. Localized edema  R60.0 782.3 CBC WITH AUTOMATED DIFF      METABOLIC PANEL, COMPREHENSIVE   3. Multiple joint pain  W27.52 367.45 METABOLIC PANEL, COMPREHENSIVE      RUBEN, DIRECT, W/REFLEX      SED RATE (ESR)      CK   4. Other fatigue  R53.83 780.79 CBC WITH AUTOMATED DIFF      T4, FREE      T3, FREE      TSH 3RD GENERATION      METABOLIC PANEL, COMPREHENSIVE      VITAMIN B12   5. Vitamin D deficiency  E55.9 268.9 VITAMIN D, 25 HYDROXY   6. Muscle spasm  M62.838 728.85 CK     Orders Placed This Encounter    CBC WITH AUTOMATED DIFF    T4, FREE    T3, FREE    TSH 3RD GENERATION    METABOLIC PANEL, COMPREHENSIVE    VITAMIN D, 25 HYDROXY    VITAMIN B12    RUBEN, DIRECT, W/REFLEX    SED RATE (ESR)    CK    diclofenac EC (VOLTAREN) 50 mg EC tablet     Sig: Take 1 Tablet by mouth two (2) times a day for 60 days.      Dispense:  60 Tablet     Refill:  1    liraglutide, weight loss, (SAXENDA) 3 mg/0.5 mL (18 mg/3 mL) pen     Sig: Week 1 take 0.6 mg subcu daily -then week 2 take 1.2 mg subcu daily- then week 3 1.8 mg daily- then week for 2.4 mg subcu daily and thereafter     Dispense:  1 Each     Refill:  1       lose weight, increase physical activity, continue present plan, routine labs ordered, call if any problems    There are no Patient Instructions on file for this visit. Follow-up and Dispositions    · Return in about 6 weeks (around 12/28/2021).

## 2021-11-18 LAB
25(OH)D3+25(OH)D2 SERPL-MCNC: 14.8 NG/ML (ref 30–100)
ALBUMIN SERPL-MCNC: 4.3 G/DL (ref 3.8–4.8)
ALBUMIN/GLOB SERPL: 2 {RATIO} (ref 1.2–2.2)
ALP SERPL-CCNC: 58 IU/L (ref 44–121)
ALT SERPL-CCNC: 9 IU/L (ref 0–32)
ANA SER QL: NEGATIVE
AST SERPL-CCNC: 12 IU/L (ref 0–40)
BASOPHILS # BLD AUTO: 0 X10E3/UL (ref 0–0.2)
BASOPHILS NFR BLD AUTO: 1 %
BILIRUB SERPL-MCNC: <0.2 MG/DL (ref 0–1.2)
BUN SERPL-MCNC: 10 MG/DL (ref 6–24)
BUN/CREAT SERPL: 16 (ref 9–23)
CALCIUM SERPL-MCNC: 8.9 MG/DL (ref 8.7–10.2)
CHLORIDE SERPL-SCNC: 107 MMOL/L (ref 96–106)
CK SERPL-CCNC: 52 U/L (ref 32–182)
CO2 SERPL-SCNC: 21 MMOL/L (ref 20–29)
CREAT SERPL-MCNC: 0.61 MG/DL (ref 0.57–1)
EOSINOPHIL # BLD AUTO: 0.2 X10E3/UL (ref 0–0.4)
EOSINOPHIL NFR BLD AUTO: 3 %
ERYTHROCYTE [DISTWIDTH] IN BLOOD BY AUTOMATED COUNT: 13 % (ref 11.7–15.4)
ERYTHROCYTE [SEDIMENTATION RATE] IN BLOOD BY WESTERGREN METHOD: 30 MM/HR (ref 0–32)
GLOBULIN SER CALC-MCNC: 2.2 G/DL (ref 1.5–4.5)
GLUCOSE SERPL-MCNC: 101 MG/DL (ref 65–99)
HCT VFR BLD AUTO: 37.8 % (ref 34–46.6)
HGB BLD-MCNC: 13 G/DL (ref 11.1–15.9)
IMM GRANULOCYTES # BLD AUTO: 0 X10E3/UL (ref 0–0.1)
IMM GRANULOCYTES NFR BLD AUTO: 0 %
LYMPHOCYTES # BLD AUTO: 2.6 X10E3/UL (ref 0.7–3.1)
LYMPHOCYTES NFR BLD AUTO: 31 %
MCH RBC QN AUTO: 31.6 PG (ref 26.6–33)
MCHC RBC AUTO-ENTMCNC: 34.4 G/DL (ref 31.5–35.7)
MCV RBC AUTO: 92 FL (ref 79–97)
MONOCYTES # BLD AUTO: 0.7 X10E3/UL (ref 0.1–0.9)
MONOCYTES NFR BLD AUTO: 8 %
NEUTROPHILS # BLD AUTO: 4.7 X10E3/UL (ref 1.4–7)
NEUTROPHILS NFR BLD AUTO: 57 %
PLATELET # BLD AUTO: 219 X10E3/UL (ref 150–450)
POTASSIUM SERPL-SCNC: 3.6 MMOL/L (ref 3.5–5.2)
PROT SERPL-MCNC: 6.5 G/DL (ref 6–8.5)
RBC # BLD AUTO: 4.11 X10E6/UL (ref 3.77–5.28)
SODIUM SERPL-SCNC: 140 MMOL/L (ref 134–144)
T3FREE SERPL-MCNC: 4.4 PG/ML (ref 2–4.4)
T4 FREE SERPL-MCNC: 0.8 NG/DL (ref 0.82–1.77)
TSH SERPL DL<=0.005 MIU/L-ACNC: 4.59 UIU/ML (ref 0.45–4.5)
VIT B12 SERPL-MCNC: 476 PG/ML (ref 232–1245)
WBC # BLD AUTO: 8.2 X10E3/UL (ref 3.4–10.8)

## 2021-11-19 RX ORDER — LEVOTHYROXINE AND LIOTHYRONINE 19; 4.5 UG/1; UG/1
30 TABLET ORAL DAILY
Qty: 90 TABLET | Refills: 1 | Status: SHIPPED | OUTPATIENT
Start: 2021-11-19 | End: 2022-02-27 | Stop reason: SDUPTHER

## 2021-11-19 NOTE — PROGRESS NOTES
Notify the patient complete blood count hemoglobin hematocrit normal T4 should be 0.8 to its only 0.8 with a TSH borderline at 4.59 thus we can slightly increase the thyroid since its low if she is agreeable for me to switching the to a higher dose

## 2022-02-08 RX ORDER — LEVOTHYROXINE, LIOTHYRONINE 76; 18 UG/1; UG/1
1 TABLET ORAL DAILY
Qty: 90 TABLET | Refills: 1 | Status: SHIPPED | OUTPATIENT
Start: 2022-02-08 | End: 2022-02-27 | Stop reason: SDUPTHER

## 2022-02-28 RX ORDER — LEVOTHYROXINE AND LIOTHYRONINE 19; 4.5 UG/1; UG/1
30 TABLET ORAL DAILY
Qty: 90 TABLET | Refills: 1 | Status: SHIPPED | OUTPATIENT
Start: 2022-02-28 | End: 2022-08-27

## 2022-02-28 RX ORDER — LEVOTHYROXINE, LIOTHYRONINE 76; 18 UG/1; UG/1
1 TABLET ORAL DAILY
Qty: 90 TABLET | Refills: 1 | Status: SHIPPED | OUTPATIENT
Start: 2022-02-28 | End: 2022-08-27

## 2022-03-18 PROBLEM — E04.9 GOITER: Status: ACTIVE | Noted: 2020-11-20

## 2022-03-18 PROBLEM — M72.2 PLANTAR FASCIITIS: Status: ACTIVE | Noted: 2020-11-20

## 2022-03-18 PROBLEM — F17.200 TOBACCO DEPENDENCE SYNDROME: Status: ACTIVE | Noted: 2020-11-20

## 2022-03-18 PROBLEM — E55.9 VITAMIN D DEFICIENCY: Status: ACTIVE | Noted: 2020-11-20

## 2022-03-19 PROBLEM — R53.83 FATIGUE: Status: ACTIVE | Noted: 2020-11-20

## 2022-03-19 PROBLEM — R60.9 EDEMA: Status: ACTIVE | Noted: 2020-11-20

## 2022-03-19 PROBLEM — M25.50 MULTIPLE JOINT PAIN: Status: ACTIVE | Noted: 2020-11-20

## 2022-03-19 PROBLEM — E03.9 HYPOTHYROIDISM: Status: ACTIVE | Noted: 2020-11-20

## 2022-03-19 PROBLEM — E53.8 B12 DEFICIENCY: Status: ACTIVE | Noted: 2021-11-16

## 2022-03-19 PROBLEM — E66.9 OBESITY: Status: ACTIVE | Noted: 2020-11-20

## 2022-03-19 PROBLEM — R59.0 CERVICAL LYMPHADENOPATHY: Status: ACTIVE | Noted: 2020-11-20

## 2022-03-20 PROBLEM — J06.9 URI (UPPER RESPIRATORY INFECTION): Status: ACTIVE | Noted: 2020-11-20

## 2022-08-17 ENCOUNTER — TELEPHONE (OUTPATIENT)
Dept: PRIMARY CARE CLINIC | Age: 42
End: 2022-08-17

## 2022-08-17 NOTE — TELEPHONE ENCOUNTER
Pt former provider has retired and pt is requesting refill for her [place] thyroid 120 and 30 mg.

## 2022-11-16 ENCOUNTER — OFFICE VISIT (OUTPATIENT)
Dept: PRIMARY CARE CLINIC | Age: 42
End: 2022-11-16
Payer: COMMERCIAL

## 2022-11-16 VITALS
HEIGHT: 65 IN | TEMPERATURE: 97.2 F | WEIGHT: 293 LBS | BODY MASS INDEX: 48.82 KG/M2 | RESPIRATION RATE: 20 BRPM | DIASTOLIC BLOOD PRESSURE: 80 MMHG | SYSTOLIC BLOOD PRESSURE: 118 MMHG | OXYGEN SATURATION: 98 %

## 2022-11-16 DIAGNOSIS — E03.9 ACQUIRED HYPOTHYROIDISM: Primary | ICD-10-CM

## 2022-11-16 DIAGNOSIS — E66.01 MORBID OBESITY WITH BMI OF 50.0-59.9, ADULT (HCC): ICD-10-CM

## 2022-11-16 DIAGNOSIS — Z87.891 PERSONAL HISTORY OF NICOTINE DEPENDENCE: ICD-10-CM

## 2022-11-16 DIAGNOSIS — R73.03 PREDIABETES: ICD-10-CM

## 2022-11-16 DIAGNOSIS — R22.1 NECK MASS: ICD-10-CM

## 2022-11-16 DIAGNOSIS — K58.9 IRRITABLE BOWEL SYNDROME, UNSPECIFIED TYPE: ICD-10-CM

## 2022-11-16 PROCEDURE — 99214 OFFICE O/P EST MOD 30 MIN: CPT | Performed by: FAMILY MEDICINE

## 2022-11-16 RX ORDER — THYROID, PORCINE 120 MG/1
120 TABLET ORAL
Qty: 90 TABLET | Refills: 0 | Status: SHIPPED | OUTPATIENT
Start: 2022-11-16

## 2022-11-16 RX ORDER — LEVOTHYROXINE AND LIOTHYRONINE 19; 4.5 UG/1; UG/1
30 TABLET ORAL
Qty: 90 TABLET | Refills: 0 | Status: SHIPPED | OUTPATIENT
Start: 2022-11-16

## 2022-11-16 RX ORDER — THYROID, PORCINE 120 MG/1
150 TABLET ORAL DAILY
COMMUNITY
End: 2022-11-16 | Stop reason: SDUPTHER

## 2022-11-16 RX ORDER — SEMAGLUTIDE 1.34 MG/ML
0.25 INJECTION, SOLUTION SUBCUTANEOUS
Qty: 1 BOX | Refills: 0 | Status: SHIPPED | OUTPATIENT
Start: 2022-11-16

## 2022-11-16 NOTE — PROGRESS NOTES
HPI     Chief Complaint:   Chief Complaint   Patient presents with    [facility]     Need refill on Thyroid medication       Valery Hoffman is an 40 y.o. female. Patient was previously receiving care with Dr. Ignacio Roman. Medical history significant for:   Patient Active Problem List   Diagnosis Code    Cervical lymphadenopathy R59.0    Edema R60.9    Fatigue R53.83    Goiter E04.9    Hypothyroidism E03.9    Multiple joint pain M25.50    Obesity E66.9    Plantar fasciitis M72.2    Tobacco dependence syndrome F17.200    URI (upper respiratory infection) J06.9    Vitamin D deficiency E55.9    B12 deficiency E53.8    Personal history of nicotine dependence Z87.891    Morbid obesity with BMI of 50.0-59.9, adult ([facility]) E66.01, Z68.43    Irritable bowel syndrome K58.9    Neck mass R22.1       Concerns for today's visit:    Hypothyroidism: is on synthroid 150mcg, has been out of meds for some time so has only been taking medication a few times a week to space out what she had left. Patient has increased fatigue. She has not noticed hair loss. Smoking: smokes 1/2ppd. Tried vaping, increased cough. Wellbutrin caused depression. Nicotine patches did not help, still had cravings. IBS: Follows with Dr. Guero Yun not tolerate any options tried. Morbid Obesity: was on phentermine before, it worked. Does cause palpitations. She is concerned about bariatric surgery. She has cut back on sodas, weight down from a year ago. Weight Metrics 11/16/2022 11/16/2021 5/11/2021 11/23/2020   Weight 313 lb 12.8 oz 324 lb 300 lb 314 lb 6.4 oz   BMI 52.22 kg/m2 53.92 kg/m2 49.92 kg/m2 52.32 kg/m2     Neck mass: Patient has an enlarged mass on the right side of her neck it has been there for years. Previous PCP said he was not concerned about it. She does feel it has been enlarging over the last few months. It sometimes is tender. She does not notice any masses anywhere else she.   She denies night sweats. Medications - reviewed:  Current Outpatient Medications on File Prior to Visit   Medication Sig Dispense Refill    [DISCONTINUED] Thyroid, Pork, ([place] Thyroid) 120 mg tab Take 150 mg by mouth daily. [DISCONTINUED] liraglutide, weight loss, (SAXENDA) 3 mg/0.5 mL (18 mg/3 mL) pen Week 1 take 0.6 mg subcu daily -then week 2 take 1.2 mg subcu daily- then week 3 1.8 mg daily- then week for 2.4 mg subcu daily and thereafter 1 Each 1    methocarbamoL (ROBAXIN) 500 mg tablet Take 1 Tab by mouth two (2) times daily as needed. (Patient not taking: Reported on 11/16/2022)       No current facility-administered medications on file prior to visit. Allergies - reviewed: Allergies   Allergen Reactions    Ranitidine Hives and Rash       Past Medical History - reviewed:  Past Medical History:   Diagnosis Date    Cervical lymphadenopathy 11/20/2020    Edema 11/20/2020    Fatigue 11/20/2020    Goiter 11/20/2020    Hypothyroidism 11/20/2020    Multiple joint pain 11/20/2020    Obesity 11/20/2020    Plantar fasciitis 11/20/2020    Tobacco dependence syndrome 11/20/2020    URI (upper respiratory infection) 11/20/2020    Vitamin D deficiency 11/20/2020       Past Surgical History - reviewed:  Past Surgical History:   Procedure Laterality Date    HX TONSILLECTOMY           Immunizations - reviewed:   Immunization History   Administered Date(s) Administered    COVID-19, MODERNA BLUE border, Primary or Immunocompromised, (age 18y+), IM, 100 mcg/0.5mL 01/06/2021, 02/01/2021    Influenza Vaccine 10/10/2022       Review of Systems   Review of Systems   Constitutional:  Negative for chills and fever. HENT:  Negative for congestion and ear pain. Respiratory:  Negative for cough and shortness of breath. Cardiovascular:  Negative for chest pain and palpitations. Musculoskeletal:  Negative for back pain and falls. Skin:  Negative for itching and rash.    Psychiatric/Behavioral:  Negative for hallucinations and substance abuse. Reviewed PmHx, FmHx, SocHx as well as meds and allergies, updated and dated in the chart. Objective     Visit Vitals  /80 (BP 1 Location: Left upper arm, BP Patient Position: Sitting, BP Cuff Size: Large adult)   Temp 97.2 °F (36.2 °C) (Temporal)   Resp 20   Ht 5' 5\" (1.651 m)   Wt 313 lb 12.8 oz (142.3 kg)   LMP 11/06/2022   SpO2 98%   BMI 52.22 kg/m²       Physical Exam  Vitals and nursing note reviewed. Constitutional:       General: She is not in acute distress. Appearance: She is obese. HENT:      Head: Normocephalic and atraumatic. Eyes:      Extraocular Movements: Extraocular movements intact. Conjunctiva/sclera: Conjunctivae normal.   Cardiovascular:      Rate and Rhythm: Regular rhythm. Tachycardia present. Heart sounds: No murmur heard. Pulmonary:      Effort: Pulmonary effort is normal. No respiratory distress. Breath sounds: Normal breath sounds. Lymphadenopathy:      Comments: Visible and palpable mass to the right posterior cervical chain. It is soft and mobile. No tenderness. Neurological:      General: No focal deficit present. Mental Status: She is alert and oriented to person, place, and time. Psychiatric:         Mood and Affect: Mood normal.         Behavior: Behavior normal.           Assessment and Plan     Diagnoses and all orders for this visit:    1. Acquired hypothyroidism  Comments:  Restarting last known dose of [place] Thyroid, 150 mg daily before practice. We will recheck TSH in 6 to 8 weeks. Patient to make future lab appointment today. Orders:  -     TSH RFX ON ABNORMAL TO FREE T4; Future  -     Thyroid, Pork, ([place] Thyroid) 120 mg tab; Take 1 Tablet by mouth Daily (before breakfast). -     thyroid, Pork, ([place] Thyroid) 30 mg tablet; Take 1 Tablet by mouth Daily (before breakfast). 2. Morbid obesity with BMI of 50.0-59.9, adult [facility])  Comments:  Patient weary of bariatric surgery.   Phentermine not the ideal option long-term. We discussed GLP-1 inhibitors. Checking A1c. Counseling and education provid  Orders:  -     semaglutide (Ozempic) 0.25 mg or 0.5 mg/dose (2 mg/1.5 ml) subq pen; 0.25 mg by SubCUTAneous route every seven (7) days. 3. Personal history of nicotine dependence  Comments:  Patient encouraged to quit. Different options discussed. She will consider Chantix versus nicotine patches but is not amenable to quitting at this time. Orders:  -     RI SMOKING AND TOBACCO USE CESSATION 3 - 10 MINUTES    4. Prediabetes  Comments:  Possibly? Checking A1c today. Patient encouraged to increase activity. Orders:  -     HEMOGLOBIN A1C WITH EAG  -     CBC W/O DIFF  -     semaglutide (Ozempic) 0.25 mg or 0.5 mg/dose (2 mg/1.5 ml) subq pen; 0.25 mg by SubCUTAneous route every seven (7) days. 5. Neck mass  Comments:  Unclear etiology. Enlarging per patient. Checking ultrasound. Orders:  -     US THYROID/PARATHYROID/SOFT TISS; Future    6. Irritable bowel syndrome, unspecified type  Comments:  Has followed with Dr. Ambrose Morgan. Has not tolerated previous treatment options. Monitor diet. Follow-up and Dispositions    Return in about 3 months (around 2/16/2023) for annual physical.         I discussed the aforementioned diagnoses with the patient as well as the plan of care. All questions were answered and an AVS was provided. As applicable:  Medication Side Effects and Warnings were discussed with patient, as indicated. Patient Labs were reviewed and or requested, as indicated. Patient Past Records were reviewed and or requested, as indicated.     Sterling Castellano MD  [facility]  [facility], [place], [address] [place] [address]

## 2022-11-16 NOTE — PROGRESS NOTES
1. \"Have you been to the ER, urgent care clinic since your last visit? Hospitalized since your last visit? \" No    2. \"Have you seen or consulted any other health care providers outside of the [address] [place] [address] since your last visit? \" No     3. For patients aged 39-70: Has the patient had a colonoscopy / FIT/ Cologuard? NA - based on age      If the patient is female:    4. For patients aged 41-77: Has the patient had a mammogram within the past 2 years? No      5. For patients aged 21-65: Has the patient had a pap smear?  No  Visit Vitals  /80 (BP 1 Location: Left upper arm, BP Patient Position: Sitting, BP Cuff Size: Large adult)   Temp 97.2 °F (36.2 °C) (Temporal)   Resp 20   Ht 5' 5\" (1.651 m)   Wt 313 lb 12.8 oz (142.3 kg)   LMP 11/06/2022   SpO2 98%   BMI 52.22 kg/m²     Chief Complaint   Patient presents with    [facility]     Need refill on Thyroid medication

## 2022-11-17 LAB
ERYTHROCYTE [DISTWIDTH] IN BLOOD BY AUTOMATED COUNT: 13.1 % (ref 11.7–15.4)
EST. AVERAGE GLUCOSE BLD GHB EST-MCNC: 103 MG/DL
HBA1C MFR BLD: 5.2 % (ref 4.8–5.6)
HCT VFR BLD AUTO: 41.8 % (ref 34–46.6)
HGB BLD-MCNC: 13.7 G/DL (ref 11.1–15.9)
MCH RBC QN AUTO: 31.4 PG (ref 26.6–33)
MCHC RBC AUTO-ENTMCNC: 32.8 G/DL (ref 31.5–35.7)
MCV RBC AUTO: 96 FL (ref 79–97)
PLATELET # BLD AUTO: 244 X10E3/UL (ref 150–450)
RBC # BLD AUTO: 4.37 X10E6/UL (ref 3.77–5.28)
WBC # BLD AUTO: 7.1 X10E3/UL (ref 3.4–10.8)

## 2022-11-17 NOTE — PROGRESS NOTES
[employer] message sent:    Good afternoon [place],    Your labs have returned. Your screening for diabetes was negative. It also was not indicative of prediabetes. This is good news. Please let me know if you have any issues with starting the Ozempic. Please be sure to complete the ultrasound as discussed at your visit. Your blood counts were normal.    Thank you for the privilege to participate in your healthcare and please reach out should you have any further questions.      Dr. Nicolás Jorge

## 2022-11-18 ENCOUNTER — HOSPITAL ENCOUNTER (OUTPATIENT)
Dept: ULTRASOUND IMAGING | Age: 42
Discharge: HOME | End: 2022-11-18
Attending: FAMILY MEDICINE
Payer: COMMERCIAL

## 2022-11-18 DIAGNOSIS — R22.1 NECK MASS: ICD-10-CM

## 2022-11-18 PROCEDURE — 76536 US EXAM OF HEAD AND NECK: CPT

## 2022-11-23 NOTE — PROGRESS NOTES
[employer] message sent:    Good morning Eris Silva,    Your ultrasound returned and there was an incidental stable thyroid nodule. No further imaging or testing recommended. The mass on the side of the neck is consistent with a lipoma. This is a benign collection of fat cells. Given its growth and visibility I would recommend considering having it removed. Please let me know if you are amenable to this and I can place a referral to a general surgeon. Thank you for the privilege to participate in your healthcare and please reach out should you have any further questions.      Happy holidays,    Dr. HASKINS [facility]

## 2023-01-13 DIAGNOSIS — E03.9 ACQUIRED HYPOTHYROIDISM: ICD-10-CM

## 2023-01-14 LAB
T4 FREE SERPL-MCNC: 1 NG/DL (ref 0.82–1.77)
TSH SERPL DL<=0.005 MIU/L-ACNC: 9.09 UIU/ML (ref 0.45–4.5)

## 2023-01-23 DIAGNOSIS — D17.0 LIPOMA OF NECK: ICD-10-CM

## 2023-01-23 DIAGNOSIS — E03.9 ACQUIRED HYPOTHYROIDISM: ICD-10-CM

## 2023-01-23 DIAGNOSIS — R22.1 NECK MASS: Primary | ICD-10-CM

## 2023-01-23 RX ORDER — THYROID, PORCINE 180 MG/1
1 TABLET ORAL
Qty: 30 TABLET | Refills: 2 | Status: SHIPPED | OUTPATIENT
Start: 2023-01-23

## 2023-02-08 ENCOUNTER — OFFICE VISIT (OUTPATIENT)
Dept: SURGERY | Age: 43
End: 2023-02-08
Payer: COMMERCIAL

## 2023-02-08 VITALS
SYSTOLIC BLOOD PRESSURE: 132 MMHG | RESPIRATION RATE: 18 BRPM | DIASTOLIC BLOOD PRESSURE: 76 MMHG | WEIGHT: 293 LBS | HEART RATE: 117 BPM | HEIGHT: 65 IN | OXYGEN SATURATION: 98 % | TEMPERATURE: 98.9 F | BODY MASS INDEX: 48.82 KG/M2

## 2023-02-08 DIAGNOSIS — D17.0 LIPOMA OF NECK: ICD-10-CM

## 2023-02-08 PROCEDURE — 99204 OFFICE O/P NEW MOD 45 MIN: CPT | Performed by: COLON & RECTAL SURGERY

## 2023-02-08 NOTE — PROGRESS NOTES
OFFICE VISIT NOTE    Lia Cyr is a 40 y.o. female who presents to the office today for:    Chief Complaint   Patient presents with    New Patient     Lump on neck        Lynne Fletcher comes in today for evaluation of a right neck lipoma. She says that she has had a mass in the right side of the neck for quite a while however recently has increased in size. She had an ultrasound in November which was consistent with a lipoma. She denies any overlying skin changes or drainage. She does state that at times is uncomfortable. She otherwise a history of hypothyroidism and irritable bowel syndrome.       Past Medical History:   Diagnosis Date    Cervical lymphadenopathy 11/20/2020    Edema 11/20/2020    Fatigue 11/20/2020    Goiter 11/20/2020    Hypothyroidism 11/20/2020    Multiple joint pain 11/20/2020    Obesity 11/20/2020    Plantar fasciitis 11/20/2020    Tobacco dependence syndrome 11/20/2020    URI (upper respiratory infection) 11/20/2020    Vitamin D deficiency 11/20/2020       Past Surgical History:   Procedure Laterality Date    HX TONSILLECTOMY         Family History   Problem Relation Age of Onset    Hypertension Mother     Elevated Lipids Mother     Kidney Disease Mother     Thyroid Disease Father        Social History     Socioeconomic History    Marital status:      Spouse name: Not on file    Number of children: Not on file    Years of education: Not on file    Highest education level: Not on file   Occupational History    Not on file   Tobacco Use    Smoking status: Former     Packs/day: 0.50     Types: Cigarettes    Smokeless tobacco: Never   Vaping Use    Vaping Use: Never used   Substance and Sexual Activity    Alcohol use: Yes     Comment: Occasional    Drug use: Never    Sexual activity: Not on file   Other Topics Concern    Not on file   Social History Narrative Not on file     Social Determinants of Health     Financial Resource Strain: Low Risk     Difficulty of Paying Living Expenses: Not very hard   Food Insecurity: No Food Insecurity    Worried About Running Out of Food in the Last Year: Never true    Ran Out of Food in the Last Year: Never true   Transportation Needs: Not on file   Physical Activity: Unknown    Days of Exercise per Week: 0 days    Minutes of Exercise per Session: Not on file   Stress: Not on file   Social Connections: Not on file   Intimate Partner Violence: Not At Risk    Fear of Current or Ex-Partner: No    Emotionally Abused: No    Physically Abused: No    Sexually Abused: No   Housing Stability: Not on file       Allergies   Allergen Reactions    Ranitidine Hives and Rash       Current Outpatient Medications   Medication Sig    Thyroid, Pork, ([place] Thyroid) 180 mg tab Take 1 Tablet by mouth Daily (before breakfast). semaglutide (Ozempic) 0.25 mg or 0.5 mg/dose (2 mg/1.5 ml) subq pen 0.25 mg by SubCUTAneous route every seven (7) days. methocarbamoL (ROBAXIN) 500 mg tablet Take 1 Tab by mouth two (2) times daily as needed. (Patient not taking: No sig reported)     No current facility-administered medications for this visit. Review of Systems   All other systems reviewed and are negative. /76 (BP 1 Location: Left upper arm, BP Patient Position: Sitting)   Pulse (!) 117   Temp 98.9 °F (37.2 °C) (Temporal)   Resp 18   Ht 5' 5\" (1.651 m)   Wt 143.1 kg (315 lb 8 oz)   LMP 01/16/2023 (Approximate)   SpO2 98%   BMI 52.50 kg/m²     Physical Exam  Constitutional:       General: She is not in acute distress. Appearance: Normal appearance. She is not ill-appearing. HENT:      Head: Normocephalic and atraumatic. Neck:        Comments: Approximately 2.5 cm lipoma right neck  Cardiovascular:      Rate and Rhythm: Normal rate.    Pulmonary:      Effort: Pulmonary effort is normal.   Abdominal:      General: There is no distension. Tenderness: There is no abdominal tenderness. Musculoskeletal:         General: Normal range of motion. Cervical back: Normal range of motion and neck supple. Skin:     General: Skin is warm and dry. Neurological:      General: No focal deficit present. Mental Status: She is alert and oriented to person, place, and time. Psychiatric:         Mood and Affect: Mood normal.         Thought Content: Thought content normal.         Judgment: Judgment normal.       Problem List Items Addressed This Visit          Integumentary    Lipoma of neck       Assessment and Plan:    Given the fact it has increased in size recently I recommended excision to the patient. I reviewed the risks and benefits including the risk of infection, bleeding, recurrence, poor cosmetic outcome. She wishes to wait on surgery at this time primarily because of financial reasons. She wants to find out exactly how much her portion of the bill will be with her current insurance. She will contact my office when she is decided whether or not to proceed with surgery.           Lori Tierney MD

## 2023-02-08 NOTE — LETTER
2/8/2023    Patient: Flakita Orta   YOB: 1980   Date of Visit: 2/8/2023     Bessy Guzman MD  [address] [place] [address]  [facility] [facility] [address]    Dear Bessy Guzman MD,      Thank you for referring Ms. Satish Hernandez to [address] [place] [address] for evaluation. My notes for this consultation are attached. If you have questions, please do not hesitate to call me. I look forward to following your patient along with you.       Sincerely,    Christen Frazier MD

## 2023-02-08 NOTE — PROGRESS NOTES
Identified pt with two pt identifiers (name and ). Reviewed chart in preparation for visit and have obtained necessary documentation.     Zarina January is a 40 y.o. female  Chief Complaint   Patient presents with    New Patient     Lump on neck      Visit Vitals  /76 (BP 1 Location: Left upper arm, BP Patient Position: Sitting)   Pulse (!) 117   Temp 98.9 °F (37.2 °C) (Temporal)   Resp 18   Ht 5' 5\" (1.651 m)   Wt 315 lb 8 oz (143.1 kg)   LMP 2023 (Approximate)   SpO2 98%   BMI 52.50 kg/m²

## 2023-02-09 ENCOUNTER — TELEPHONE (OUTPATIENT)
Dept: SURGERY | Age: 43
End: 2023-02-09

## 2023-02-09 NOTE — TELEPHONE ENCOUNTER
I tried to call patient regarding estimate for surgery. I left her a message to return my call. Estimate is $142.50.

## 2023-02-16 ENCOUNTER — OFFICE VISIT (OUTPATIENT)
Dept: PRIMARY CARE CLINIC | Age: 43
End: 2023-02-16
Payer: COMMERCIAL

## 2023-02-16 VITALS
TEMPERATURE: 97.4 F | SYSTOLIC BLOOD PRESSURE: 136 MMHG | HEART RATE: 98 BPM | RESPIRATION RATE: 20 BRPM | DIASTOLIC BLOOD PRESSURE: 80 MMHG | WEIGHT: 293 LBS | BODY MASS INDEX: 48.82 KG/M2 | HEIGHT: 65 IN | OXYGEN SATURATION: 98 %

## 2023-02-16 DIAGNOSIS — Z11.59 NEED FOR HEPATITIS C SCREENING TEST: ICD-10-CM

## 2023-02-16 DIAGNOSIS — M79.672 PAIN OF LEFT HEEL: ICD-10-CM

## 2023-02-16 DIAGNOSIS — Z00.00 WELL FEMALE EXAM WITHOUT GYNECOLOGICAL EXAM: Primary | ICD-10-CM

## 2023-02-16 DIAGNOSIS — E66.01 MORBID OBESITY WITH BMI OF 50.0-59.9, ADULT (HCC): ICD-10-CM

## 2023-02-16 DIAGNOSIS — E03.9 ACQUIRED HYPOTHYROIDISM: ICD-10-CM

## 2023-02-16 DIAGNOSIS — F17.200 TOBACCO DEPENDENCE SYNDROME: ICD-10-CM

## 2023-02-16 DIAGNOSIS — M72.2 PLANTAR FASCIITIS OF LEFT FOOT: ICD-10-CM

## 2023-02-16 DIAGNOSIS — E55.9 VITAMIN D DEFICIENCY: ICD-10-CM

## 2023-02-16 DIAGNOSIS — R60.0 BILATERAL LOWER EXTREMITY EDEMA: ICD-10-CM

## 2023-02-16 PROCEDURE — 99213 OFFICE O/P EST LOW 20 MIN: CPT | Performed by: FAMILY MEDICINE

## 2023-02-16 PROCEDURE — 99396 PREV VISIT EST AGE 40-64: CPT | Performed by: FAMILY MEDICINE

## 2023-02-16 RX ORDER — SEMAGLUTIDE 0.25 MG/.5ML
1 INJECTION, SOLUTION SUBCUTANEOUS
Qty: 4 EACH | Refills: 0 | Status: SHIPPED | OUTPATIENT
Start: 2023-02-16

## 2023-02-16 NOTE — PROGRESS NOTES
Identified Patient with two Patient identifiers(name and ). 1. \"Have you been to the ER, urgent care clinic since your last visit? Hospitalized since your last visit? \" No    2. \"Have you seen or consulted any other health care providers outside of the [address] [place] [address] since your last visit? \" No     3. For patients aged 39-70: Has the patient had a colonoscopy / FIT/ Cologuard? NA - based on age      If the patient is female:    4. For patients aged 41-77: Has the patient had a mammogram within the past 2 years? No      5. For patients aged 21-65: Has the patient had a pap smear? No     There were no vitals taken for this visit.     Chief Complaint   Patient presents with    Physical       Health Maintenance Due   Topic Date Due    Hepatitis C Screening  Never done    DTaP/Tdap/Td series (1 - Tdap) Never done    Cervical cancer screen  Never done    COVID-19 Vaccine (3 - Booster for Moderna series) 2021    Lipid Screen  Never done    Visit Vitals  /80 (BP 1 Location: Left upper arm, BP Patient Position: Sitting, BP Cuff Size: Large adult)   Pulse 98   Temp 97.4 °F (36.3 °C) (Temporal)   Resp 20   Ht 5' 5\" (1.651 m)   Wt 316 lb 3.2 oz (143.4 kg)   SpO2 98%   BMI 52.62 kg/m²

## 2023-02-16 NOTE — PROGRESS NOTES
HPI     Chief Complaint   Patient presents with    Physical       Jeri Simpson is a 40 y.o. female presenting for well woman exam and is also due for follow up care of chronic issues. Jeri Simpson is willing to do both appointments today and realizes that there may be a co-pay for the follow-up portion of the visit. She has a history of:  Patient Active Problem List   Diagnosis Code    Cervical lymphadenopathy R59.0    Edema R60.9    Fatigue R53.83    Goiter E04.9    Hypothyroidism E03.9    Multiple joint pain M25.50    Obesity E66.9    Plantar fasciitis M72.2    Tobacco dependence syndrome F17.200    URI (upper respiratory infection) J06.9    Vitamin D deficiency E55.9    B12 deficiency E53.8    Personal history of nicotine dependence Z87.891    Morbid obesity with BMI of 50.0-59.9, adult (HCC) E66.01, Z68.43    Irritable bowel syndrome K58.9    Neck mass R22.1    Lipoma of neck D17.0       Specialist: Dr. Kris Mccarty for excision of suspected neck lipoma  Occupation: RN  Dentist: UTD    Health Maintenance reviewed -  Colonoscopy - due at regular interval  HCV screening - due  HIV screening - accepted  COVID vaccines - vaccinated but not boosted  Pneumoccocal vaccines - reviewed  TDaP - UTD      Chronic Problems  Smokin/2 ppd. Wellbutrin caused headaches and bad side effects. Obesity: ozempic not covered. Not doing much for diet/exercise. Hypothyroidism: stable on synthroid 180mcg daily. Vitamin D Deficiency: due for lab check. Concerns today:   Left foot pain: severe with walking. Worse with first steps in the morning and then improves. Discomfort along the heel of foot. Hx of plantar fasciitis, but this feels different. No hx of injury. Leg swelling: Patient has swelling in both lower legs to the mid shin. This has been ongoing for some time. 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   Her previous PCP prescribed a diuretic, she believes it was Bumex, it caused her to urinate a lot. She would like to avoid this. She denies dyspnea. Gyn History  She gets a monthly menstrual cycle        Allergies- reviewed  Allergies   Allergen Reactions    Ranitidine Hives and Rash       Medications- reviewed  Current Outpatient Medications   Medication Sig    semaglutide, weight loss, (Wegovy) 0.25 mg/0.5 mL pnij 1 Pen by SubCUTAneous route every seven (7) days. Indications: weight loss management for an obese person, Morbid obesity, BMI >52%. Thyroid, Pork, ([place] Thyroid) 180 mg tab Take 1 Tablet by mouth Daily (before breakfast). methocarbamoL (ROBAXIN) 500 mg tablet Take 1 Tablet by mouth two (2) times daily as needed. No current facility-administered medications for this visit. Immunizations - reviewed:   Immunization History   Administered Date(s) Administered    COVID-19, MODERNA BLUE border, Primary or Immunocompromised, (age 18y+), IM, 100 mcg/0.5mL 01/06/2021, 02/01/2021    Influenza Vaccine 10/10/2022     Flu: recommended every fall. Review of Systems   Review of Systems   Constitutional:  Negative for chills and fever. Eyes:  Negative for discharge and redness. Respiratory:  Negative for cough and shortness of breath. Cardiovascular:  Negative for chest pain and palpitations. Gastrointestinal:  Negative for abdominal pain and blood in stool. Genitourinary:  Negative for dysuria and hematuria. Musculoskeletal:  Positive for joint pain. Negative for falls and myalgias. Skin:  Negative for itching and rash. Neurological:  Negative for focal weakness and headaches. Endo/Heme/Allergies:  Negative for polydipsia. Does not bruise/bleed easily. Psychiatric/Behavioral:  Negative for hallucinations and substance abuse. Reviewed PmHx, FmHx, SocHx as well as meds and allergies, updated and dated in the chart.   Social History     Tobacco Use    Smoking status: Former     Packs/day: 0.50     Types: Cigarettes    Smokeless tobacco: Never   Vaping Use    Vaping Use: Never used   Substance Use Topics    Alcohol use: Yes     Comment: Occasional    Drug use: Never     Past Medical History:   Diagnosis Date    Cervical lymphadenopathy 11/20/2020    Edema 11/20/2020    Fatigue 11/20/2020    Goiter 11/20/2020    Hypothyroidism 11/20/2020    Multiple joint pain 11/20/2020    Obesity 11/20/2020    Plantar fasciitis 11/20/2020    Tobacco dependence syndrome 11/20/2020    URI (upper respiratory infection) 11/20/2020    Vitamin D deficiency 11/20/2020     Family History   Problem Relation Age of Onset    Hypertension Mother     Elevated Lipids Mother     Kidney Disease Mother     Thyroid Disease Father           Objective     Visit Vitals  /80 (BP 1 Location: Left upper arm, BP Patient Position: Sitting, BP Cuff Size: Large adult)   Pulse 98   Temp 97.4 °F (36.3 °C) (Temporal)   Resp 20   Ht 5' 5\" (1.651 m)   Wt 316 lb 3.2 oz (143.4 kg)   SpO2 98%   BMI 52.62 kg/m²       Physical Exam  Vitals and nursing note reviewed. Constitutional:       General: She is not in acute distress. Appearance: Normal appearance. She is obese. HENT:      Head: Normocephalic and atraumatic. Right Ear: Tympanic membrane and external ear normal.      Left Ear: Tympanic membrane and external ear normal.      Nose: Nose normal. No rhinorrhea. Mouth/Throat:      Mouth: Mucous membranes are moist.      Pharynx: Oropharynx is clear. No oropharyngeal exudate. Eyes:      Extraocular Movements: Extraocular movements intact. Conjunctiva/sclera: Conjunctivae normal.      Pupils: Pupils are equal, round, and reactive to light. Cardiovascular:      Rate and Rhythm: Normal rate and regular rhythm. Heart sounds: No murmur heard. Pulmonary:      Effort: Pulmonary effort is normal. No respiratory distress. Breath sounds: Normal breath sounds. No rales. Abdominal:      General: Abdomen is flat. There is no distension.       Palpations: Abdomen is soft. Genitourinary:     Labia:         Right: No rash. Left: No rash. Musculoskeletal:         General: No swelling or deformity. Normal range of motion. Cervical back: Normal range of motion and neck supple. Right lower leg: Edema present. Left lower leg: Edema present. Skin:     General: Skin is warm. Capillary Refill: Capillary refill takes less than 2 seconds. Findings: No rash. Neurological:      General: No focal deficit present. Mental Status: She is alert and oriented to person, place, and time. Mental status is at baseline. Psychiatric:         Mood and Affect: Mood normal.         Behavior: Behavior normal.      Pelvic and breast exam deferred: patient on menstrual cycle. Assessment and Plan     Patient will return for pap and breast exam.     Diagnoses and all orders for this visit:    1. Well female exam without gynecological exam  Comments:  Age appropriate guidance given. HM updated. Orders:  -     CBC W/O DIFF  -     METABOLIC PANEL, COMPREHENSIVE  -     LIPID PANEL  -     TSH 3RD GENERATION  -     HEMOGLOBIN A1C WITH EAG    2. Bilateral lower extremity edema  Comments:  Pt to use compression stockings and elevate legs throughout the day. Checking renal function on labs. She would like to avoid meds for now. 3. Acquired hypothyroidism  Comments:  Stable. Lab check today. Orders:  -     TSH 3RD GENERATION    4. Morbid obesity with BMI of 50.0-59.9, adult [facility])  Comments:  Patient encouraged to increase activity. We will see if Nickolas Quispe is covered. She is not a candidate for contrave given SE to bupropion. Orders:  -     METABOLIC PANEL, COMPREHENSIVE  -     LIPID PANEL  -     semaglutide, weight loss, (Wegovy) 0.25 mg/0.5 mL pnij; 1 Pen by SubCUTAneous route every seven (7) days. Indications: weight loss management for an obese person, Morbid obesity, BMI >52%. 5. Vitamin D deficiency  Comments:  Stable, lab check. Orders:  -     VITAMIN D, 25 HYDROXY    6. Tobacco dependence syndrome  Comments:  Patient not ready to quit. Did not tolerate wellbutrin previously. 7. Pain of left heel  Comments:  See above. 8. Plantar fasciitis of left foot  Comments:  Concerns for calcaneal spur as well. Checking Xray. Plantar fasciitis exercises encouraged. Orders:  -     XR CALCANEUS LT; Future    9. Need for hepatitis C screening test  -     HEPATITIS C AB     Follow-up and Dispositions    Return in about 3 months (around 5/16/2023). As applicable:  Medication Side Effects and Warnings were discussed with patient, as indicated. Patient Labs were reviewed and or requested, as indicated. Patient Past Records were reviewed and or requested, as indicated. I discussed the aforementioned diagnoses with the patient as well as the plan of care. All questions were answered and an AVS was provided.        Leela Stack MD  [address] [place] [address]

## 2023-02-17 LAB
25(OH)D3+25(OH)D2 SERPL-MCNC: 17 NG/ML (ref 30–100)
ALBUMIN SERPL-MCNC: 4.5 G/DL (ref 3.8–4.8)
ALBUMIN/GLOB SERPL: 1.7 {RATIO} (ref 1.2–2.2)
ALP SERPL-CCNC: 50 IU/L (ref 44–121)
ALT SERPL-CCNC: 12 IU/L (ref 0–32)
AST SERPL-CCNC: 18 IU/L (ref 0–40)
BILIRUB SERPL-MCNC: 0.4 MG/DL (ref 0–1.2)
BUN SERPL-MCNC: 13 MG/DL (ref 6–24)
BUN/CREAT SERPL: 19 (ref 9–23)
CALCIUM SERPL-MCNC: 9.3 MG/DL (ref 8.7–10.2)
CHLORIDE SERPL-SCNC: 106 MMOL/L (ref 96–106)
CHOLEST SERPL-MCNC: 185 MG/DL (ref 100–199)
CO2 SERPL-SCNC: 17 MMOL/L (ref 20–29)
CREAT SERPL-MCNC: 0.69 MG/DL (ref 0.57–1)
EGFRCR SERPLBLD CKD-EPI 2021: 112 ML/MIN/1.73
ERYTHROCYTE [DISTWIDTH] IN BLOOD BY AUTOMATED COUNT: 12.6 % (ref 11.7–15.4)
EST. AVERAGE GLUCOSE BLD GHB EST-MCNC: 111 MG/DL
GLOBULIN SER CALC-MCNC: 2.6 G/DL (ref 1.5–4.5)
GLUCOSE SERPL-MCNC: 98 MG/DL (ref 70–99)
HBA1C MFR BLD: 5.5 % (ref 4.8–5.6)
HCT VFR BLD AUTO: 39.8 % (ref 34–46.6)
HCV IGG SERPL QL IA: NON REACTIVE
HDLC SERPL-MCNC: 68 MG/DL
HGB BLD-MCNC: 13.4 G/DL (ref 11.1–15.9)
LDLC SERPL CALC-MCNC: 102 MG/DL (ref 0–99)
MCH RBC QN AUTO: 30.4 PG (ref 26.6–33)
MCHC RBC AUTO-ENTMCNC: 33.7 G/DL (ref 31.5–35.7)
MCV RBC AUTO: 90 FL (ref 79–97)
PLATELET # BLD AUTO: 212 X10E3/UL (ref 150–450)
POTASSIUM SERPL-SCNC: 3.9 MMOL/L (ref 3.5–5.2)
PROT SERPL-MCNC: 7.1 G/DL (ref 6–8.5)
RBC # BLD AUTO: 4.41 X10E6/UL (ref 3.77–5.28)
SODIUM SERPL-SCNC: 141 MMOL/L (ref 134–144)
TRIGL SERPL-MCNC: 83 MG/DL (ref 0–149)
TSH SERPL DL<=0.005 MIU/L-ACNC: 0.77 UIU/ML (ref 0.45–4.5)
VLDLC SERPL CALC-MCNC: 15 MG/DL (ref 5–40)
WBC # BLD AUTO: 5.5 X10E3/UL (ref 3.4–10.8)

## 2023-02-17 NOTE — PROGRESS NOTES
[employer] message sent:    Adrian Erickson,    Your labs have returned. Your vitamin D is low. Please take 800IU daily over-the-counter. Your LDL, the bad cholesterol, is elevated. Please work on a diet low in red meat and pork. Also limit fast food and processed foods. Your thyroid function is normal. Let's continue your medication as is. Your screening for diabetes is negative. Your blood counts, kidney function, and liver function are within acceptable limits. Thank you for the privilege to participate in your healthcare and please reach out should you have any further questions.      Dr. Alicia Zamudio

## 2023-02-24 ENCOUNTER — TELEPHONE (OUTPATIENT)
Dept: PRIMARY CARE CLINIC | Age: 43
End: 2023-02-24

## 2023-02-24 NOTE — TELEPHONE ENCOUNTER
Mahesh Martinez (KeyMichae Rinne)  Rx #: [id number]  Wegovy 0.25MG/0.5ML auto-injectors     Form  Med[employer] ePA Form 2017 NCPDP      CoverMyMeds-pending

## 2023-02-28 ENCOUNTER — TELEPHONE (OUTPATIENT)
Dept: PRIMARY CARE CLINIC | Age: 43
End: 2023-02-28

## 2023-02-28 ENCOUNTER — PATIENT MESSAGE (OUTPATIENT)
Dept: PRIMARY CARE CLINIC | Age: 43
End: 2023-02-28

## 2023-02-28 NOTE — TELEPHONE ENCOUNTER
Your prior authorization request has been denied. [facility]  Message from plan: This request has not been approved. Your request was reviewed by the health plan and based on the information submitted was not approved. No medications indicated for the treatment of weight loss are covered by the health plan. That is why your request has been denied.  assistance may be available. A written notification letter will follow with additional details.

## 2023-05-03 DIAGNOSIS — M72.2 PLANTAR FASCIITIS OF LEFT FOOT: Primary | ICD-10-CM

## 2023-05-19 RX ORDER — METHOCARBAMOL 500 MG/1
1 TABLET, FILM COATED ORAL 2 TIMES DAILY PRN
COMMUNITY

## 2023-07-19 ENCOUNTER — TELEPHONE (OUTPATIENT)
Dept: PRIMARY CARE CLINIC | Facility: CLINIC | Age: 43
End: 2023-07-19

## 2023-07-19 DIAGNOSIS — E03.9 ACQUIRED HYPOTHYROIDISM: Primary | ICD-10-CM

## 2023-07-19 NOTE — TELEPHONE ENCOUNTER
Pt made OV 9-26-23 she needs her amour thyroid until this appt she uses [facility] pharmacy.

## 2023-09-21 ENCOUNTER — HOSPITAL ENCOUNTER (EMERGENCY)
Facility: HOSPITAL | Age: 43
Discharge: HOME | End: 2023-09-21
Attending: STUDENT IN AN ORGANIZED HEALTH CARE EDUCATION/TRAINING PROGRAM
Payer: COMMERCIAL

## 2023-09-21 ENCOUNTER — NURSE TRIAGE (OUTPATIENT)
Dept: OTHER | Facility: CLINIC | Age: 43
End: 2023-09-21

## 2023-09-21 VITALS
HEART RATE: 109 BPM | SYSTOLIC BLOOD PRESSURE: 136 MMHG | TEMPERATURE: 98.2 F | DIASTOLIC BLOOD PRESSURE: 86 MMHG | BODY MASS INDEX: 48.82 KG/M2 | HEIGHT: 65 IN | RESPIRATION RATE: 18 BRPM | OXYGEN SATURATION: 94 % | WEIGHT: 293 LBS
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
## 2023-09-21 DIAGNOSIS — S16.1XXA ACUTE STRAIN OF NECK MUSCLE, INITIAL ENCOUNTER: ICD-10-CM

## 2023-09-21 DIAGNOSIS — M62.830 MUSCLE SPASM OF BACK: ICD-10-CM

## 2023-09-21 PROCEDURE — 99283 EMERGENCY DEPT VISIT LOW MDM: CPT

## 2023-09-21 PROCEDURE — 94761 N-INVAS EAR/PLS OXIMETRY MLT: CPT

## 2023-09-21 RX ORDER — METHOCARBAMOL 750 MG/1
750 TABLET, FILM COATED ORAL 4 TIMES DAILY
Qty: 40 TABLET | Refills: 0 | Status: SHIPPED | OUTPATIENT
Start: 2023-09-21 | End: 2023-10-01

## 2023-09-21 ASSESSMENT — PAIN - FUNCTIONAL ASSESSMENT: PAIN_FUNCTIONAL_ASSESSMENT: 0-10

## 2023-09-21 ASSESSMENT — PAIN SCALES - GENERAL: PAINLEVEL_OUTOF10: 5

## 2023-09-21 ASSESSMENT — PAIN DESCRIPTION - LOCATION: LOCATION: FACE;NECK;BACK

## 2023-09-21 ASSESSMENT — LIFESTYLE VARIABLES
HOW MANY STANDARD DRINKS CONTAINING ALCOHOL DO YOU HAVE ON A TYPICAL DAY: PATIENT DOES NOT DRINK
HOW OFTEN DO YOU HAVE A DRINK CONTAINING ALCOHOL: NEVER
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
## 2023-09-26 ENCOUNTER — OFFICE VISIT (OUTPATIENT)
Dept: PRIMARY CARE CLINIC | Facility: CLINIC | Age: 43
End: 2023-09-26
Payer: COMMERCIAL

## 2023-09-26 VITALS
DIASTOLIC BLOOD PRESSURE: 90 MMHG | BODY MASS INDEX: 48.82 KG/M2 | HEIGHT: 65 IN | SYSTOLIC BLOOD PRESSURE: 136 MMHG | TEMPERATURE: 98.2 F | RESPIRATION RATE: 16 BRPM | OXYGEN SATURATION: 98 % | WEIGHT: 293 LBS | HEART RATE: 94 BPM

## 2023-09-26 DIAGNOSIS — Z12.31 SCREENING MAMMOGRAM, ENCOUNTER FOR: ICD-10-CM

## 2023-09-26 DIAGNOSIS — E55.9 VITAMIN D DEFICIENCY, UNSPECIFIED: ICD-10-CM

## 2023-09-26 DIAGNOSIS — E66.01 MORBID (SEVERE) OBESITY DUE TO EXCESS CALORIES (HCC): ICD-10-CM

## 2023-09-26 DIAGNOSIS — E03.9 ACQUIRED HYPOTHYROIDISM: ICD-10-CM

## 2023-09-26 DIAGNOSIS — F41.1 GENERALIZED ANXIETY DISORDER: Primary | ICD-10-CM

## 2023-09-26 PROCEDURE — 99214 OFFICE O/P EST MOD 30 MIN: CPT | Performed by: NURSE PRACTITIONER

## 2023-09-26 RX ORDER — DULOXETIN HYDROCHLORIDE 20 MG/1
20 CAPSULE, DELAYED RELEASE ORAL DAILY
Qty: 30 CAPSULE | Refills: 1 | Status: SHIPPED | OUTPATIENT
Start: 2023-09-26

## 2023-09-26 SDOH — ECONOMIC STABILITY: FOOD INSECURITY: WITHIN THE PAST 12 MONTHS, THE FOOD YOU BOUGHT JUST DIDN'T LAST AND YOU DIDN'T HAVE MONEY TO GET MORE.: NEVER TRUE

## 2023-09-26 SDOH — ECONOMIC STABILITY: FOOD INSECURITY: WITHIN THE PAST 12 MONTHS, YOU WORRIED THAT YOUR FOOD WOULD RUN OUT BEFORE YOU GOT MONEY TO BUY MORE.: NEVER TRUE

## 2023-09-26 SDOH — ECONOMIC STABILITY: TRANSPORTATION INSECURITY
IN THE PAST 12 MONTHS, HAS LACK OF TRANSPORTATION KEPT YOU FROM MEETINGS, WORK, OR FROM GETTING THINGS NEEDED FOR DAILY LIVING?: NO

## 2023-09-26 SDOH — ECONOMIC STABILITY: HOUSING INSECURITY
IN THE LAST 12 MONTHS, WAS THERE A TIME WHEN YOU DID NOT HAVE A STEADY PLACE TO SLEEP OR SLEPT IN A SHELTER (INCLUDING NOW)?: NO

## 2023-09-26 SDOH — ECONOMIC STABILITY: INCOME INSECURITY: HOW HARD IS IT FOR YOU TO PAY FOR THE VERY BASICS LIKE FOOD, HOUSING, MEDICAL CARE, AND HEATING?: NOT HARD AT ALL

## 2023-09-26 ASSESSMENT — PATIENT HEALTH QUESTIONNAIRE - PHQ9
SUM OF ALL RESPONSES TO PHQ QUESTIONS 1-9: 0
SUM OF ALL RESPONSES TO PHQ QUESTIONS 1-9: 0
1. LITTLE INTEREST OR PLEASURE IN DOING THINGS: 0
SUM OF ALL RESPONSES TO PHQ QUESTIONS 1-9: 0
2. FEELING DOWN, DEPRESSED OR HOPELESS: 0
SUM OF ALL RESPONSES TO PHQ QUESTIONS 1-9: 0
SUM OF ALL RESPONSES TO PHQ9 QUESTIONS 1 & 2: 0

## 2023-09-26 ASSESSMENT — ENCOUNTER SYMPTOMS
SHORTNESS OF BREATH: 0
CHEST TIGHTNESS: 0

## 2023-09-26 NOTE — PROGRESS NOTES
Elyse Billingsley is a 44 y.o. female who was seen in clinic today (9/26/2023). Assessment & Plan:   Below is the assessment and plan developed based on review of pertinent history, physical exam, labs, studies, and medications. 1. Generalized anxiety disorder  Comments:  start on cymbalta, this may help with anxiety as well as fibromyalgia symptoms. Orders:  -     DULoxetine (CYMBALTA) 20 MG extended release capsule; Take 1 capsule by mouth daily, Disp-30 capsule, R-1Normal  2. Screening mammogram, encounter for  -     NORA DIGITAL SCREEN W OR WO CAD BILATERAL; Future  3. Acquired hypothyroidism  Comments:  checking labs. Orders:  -     TSH  4. Morbid (severe) obesity due to excess calories (HCC)  Comments:  discussed dietary modifications, increasing exercise. consider [address] [employer]   insurance changing next year, may have better coverage on medications. Orders:  -     Comprehensive Metabolic Panel  -     CBC  5. Vitamin D deficiency, unspecified  Comments:  will check labs, consider longer term vitamin D2 until levels in normal range. Orders:  -     Vitamin D 25 Hydroxy      No follow-ups on file. Subjective:   Joanna Guadalupe was seen today for Follow-up and Medication Refill     Hypothyroidism:  Patient has been using armour thyroid 180 mg with good control over hypothyroidism. Patient reported that she was nausea with synthroid and they could not get control over her thyroid at that time. Patient reported that she is chronic fatigued but has seen an improvement since switching off of synthroid. Vitamin D Deficiency: Patient has been using Vitamin D 5000 mcg a few times a week. Reported chronically low. Chronic joint pain/fatigue:  was being worked up by her previous PCP, Dr. Aly Arzola, could not find autoimmune disease. Patient reported that he thought it was related to fibromyalgia. Patient reported that she was given robaxin to use PRN.      Obesity: patient reported that she was not able

## 2023-09-27 LAB
25(OH)D3+25(OH)D2 SERPL-MCNC: 22.9 NG/ML (ref 30–100)
ALBUMIN SERPL-MCNC: 4.7 G/DL (ref 3.9–4.9)
ALBUMIN/GLOB SERPL: 1.7 {RATIO} (ref 1.2–2.2)
ALP SERPL-CCNC: 65 IU/L (ref 44–121)
ALT SERPL-CCNC: 15 IU/L (ref 0–32)
AST SERPL-CCNC: 20 IU/L (ref 0–40)
BILIRUB SERPL-MCNC: 0.4 MG/DL (ref 0–1.2)
BUN SERPL-MCNC: 12 MG/DL (ref 6–24)
BUN/CREAT SERPL: 15 (ref 9–23)
CALCIUM SERPL-MCNC: 9.8 MG/DL (ref 8.7–10.2)
CHLORIDE SERPL-SCNC: 104 MMOL/L (ref 96–106)
CO2 SERPL-SCNC: 19 MMOL/L (ref 20–29)
CREAT SERPL-MCNC: 0.79 MG/DL (ref 0.57–1)
EGFRCR SERPLBLD CKD-EPI 2021: 96 ML/MIN/1.73
ERYTHROCYTE [DISTWIDTH] IN BLOOD BY AUTOMATED COUNT: 12.2 % (ref 11.7–15.4)
GLOBULIN SER CALC-MCNC: 2.7 G/DL (ref 1.5–4.5)
GLUCOSE SERPL-MCNC: 91 MG/DL (ref 70–99)
HCT VFR BLD AUTO: 43.5 % (ref 34–46.6)
HGB BLD-MCNC: 14.5 G/DL (ref 11.1–15.9)
MCH RBC QN AUTO: 30.3 PG (ref 26.6–33)
MCHC RBC AUTO-ENTMCNC: 33.3 G/DL (ref 31.5–35.7)
MCV RBC AUTO: 91 FL (ref 79–97)
PLATELET # BLD AUTO: 214 X10E3/UL (ref 150–450)
POTASSIUM SERPL-SCNC: 4 MMOL/L (ref 3.5–5.2)
PROT SERPL-MCNC: 7.4 G/DL (ref 6–8.5)
RBC # BLD AUTO: 4.79 X10E6/UL (ref 3.77–5.28)
SODIUM SERPL-SCNC: 142 MMOL/L (ref 134–144)
TSH SERPL DL<=0.005 MIU/L-ACNC: 1.36 UIU/ML (ref 0.45–4.5)
WBC # BLD AUTO: 7.8 X10E3/UL (ref 3.4–10.8)

## 2023-09-27 RX ORDER — ERGOCALCIFEROL 1.25 MG/1
50000 CAPSULE ORAL WEEKLY
Qty: 12 CAPSULE | Refills: 0 | Status: SHIPPED | OUTPATIENT
Start: 2023-09-27 | End: 2023-12-20

## 2023-10-24 DIAGNOSIS — E03.9 ACQUIRED HYPOTHYROIDISM: ICD-10-CM

## 2023-10-24 DIAGNOSIS — F41.1 GENERALIZED ANXIETY DISORDER: ICD-10-CM

## 2023-10-24 RX ORDER — DULOXETIN HYDROCHLORIDE 20 MG/1
20 CAPSULE, DELAYED RELEASE ORAL DAILY
Qty: 30 CAPSULE | Refills: 1 | Status: SHIPPED | OUTPATIENT
Start: 2023-10-24

## 2024-01-16 ENCOUNTER — HOSPITAL ENCOUNTER (EMERGENCY)
Facility: HOSPITAL | Age: 44
Discharge: HOME | End: 2024-01-16
Payer: COMMERCIAL

## 2024-01-16 ENCOUNTER — APPOINTMENT (OUTPATIENT)
Facility: HOSPITAL | Age: 44
End: 2024-01-16
Payer: COMMERCIAL

## 2024-01-16 VITALS
WEIGHT: 293 LBS | RESPIRATION RATE: 20 BRPM | TEMPERATURE: 98.7 F | BODY MASS INDEX: 48.82 KG/M2 | HEART RATE: 111 BPM | DIASTOLIC BLOOD PRESSURE: 73 MMHG | SYSTOLIC BLOOD PRESSURE: 137 MMHG | OXYGEN SATURATION: 100 % | HEIGHT: 65 IN

## 2024-01-16 DIAGNOSIS — S89.92XA LEFT KNEE INJURY, INITIAL ENCOUNTER: ICD-10-CM

## 2024-01-16 DIAGNOSIS — M23.92 ACUTE INTERNAL DERANGEMENT OF LEFT KNEE: Primary | ICD-10-CM

## 2024-01-16 PROCEDURE — 96372 THER/PROPH/DIAG INJ SC/IM: CPT

## 2024-01-16 PROCEDURE — 73562 X-RAY EXAM OF KNEE 3: CPT

## 2024-01-16 PROCEDURE — 99284 EMERGENCY DEPT VISIT MOD MDM: CPT

## 2024-01-16 PROCEDURE — 6360000002 HC RX W HCPCS: Performed by: PHYSICIAN ASSISTANT

## 2024-01-16 RX ORDER — KETOROLAC TROMETHAMINE 30 MG/ML
30 INJECTION, SOLUTION INTRAMUSCULAR; INTRAVENOUS
Status: COMPLETED | OUTPATIENT
Start: 2024-01-16 | End: 2024-01-16

## 2024-01-16 RX ORDER — IBUPROFEN 800 MG/1
800 TABLET ORAL EVERY 8 HOURS PRN
Qty: 20 TABLET | Refills: 0 | Status: SHIPPED | OUTPATIENT
Start: 2024-01-16

## 2024-01-16 RX ORDER — TRAMADOL HYDROCHLORIDE 50 MG/1
50 TABLET ORAL EVERY 6 HOURS PRN
Qty: 12 TABLET | Refills: 0 | Status: SHIPPED | OUTPATIENT
Start: 2024-01-16 | End: 2024-01-19

## 2024-01-16 RX ADMIN — KETOROLAC TROMETHAMINE 30 MG: 30 INJECTION INTRAMUSCULAR; INTRAVENOUS at 17:45

## 2024-01-16 ASSESSMENT — PAIN SCALES - GENERAL: PAINLEVEL_OUTOF10: 8

## 2024-01-16 ASSESSMENT — PAIN - FUNCTIONAL ASSESSMENT: PAIN_FUNCTIONAL_ASSESSMENT: 0-10

## 2024-01-16 ASSESSMENT — LIFESTYLE VARIABLES
HOW MANY STANDARD DRINKS CONTAINING ALCOHOL DO YOU HAVE ON A TYPICAL DAY: 1 OR 2
HOW OFTEN DO YOU HAVE A DRINK CONTAINING ALCOHOL: MONTHLY OR LESS

## 2024-01-16 NOTE — ED PROVIDER NOTES
was discussed)  None     Social Determinants affecting Dx or Tx: None    Smoking Cessation: Smoking Cessation Counseling  Discussed the risks of smoking tobacco products and the long term sequelae of tobacco use with the patient such as increased risk of heart attack, stroke, peripheral artery disease and cancer. The patient verbalized their understanding and were provided resources if asked. Counseled patient for approximately 3 minutes.     PROCEDURES   Unless otherwise noted above, none.  Procedures    Procedure Note - Splint Assessment:  5:43 PM EST  Performed by: Arya Leavitt PA-C   Splint to patient's Left knee - left was evaluated by myself. Splint in good position. N/V intact after treatment.  The procedure took 1-15 minutes, and patient tolerated well.     CRITICAL CARE TIME   Patient does not meet Critical Care Time, 0 minutes    FINAL IMPRESSION     1. Acute internal derangement of left knee    2. Left knee injury, initial encounter          DISPOSITION/PLAN   DISPOSITION Decision To Discharge 01/16/2024 05:42:35 PM    Discharge Note: The patient is stable for discharge home. The signs, symptoms, diagnosis, and discharge instructions have been discussed, understanding conveyed, and agreed upon. The patient is to follow up as recommended or return to ER should their symptoms worsen.      PATIENT REFERRED TO:  Ana Selby MD  [address]  [address] [address]  [facility] [address] [phone]    Schedule an appointment as soon as possible for a visit       Sam Galeas MD  [address] [facility] [address] [phone]      As needed    [facility] EMERGENCY DEPARTMENT  [address] [place] [address] [phone]    If symptoms worsen        DISCHARGE MEDICATIONS:     Medication List        START taking these medications      ibuprofen 800 MG tablet  Commonly known as: ADVIL;MOTRIN  Take 1 tablet by mouth every 8 hours as needed for Pain     traMADol 50 MG

## 2024-02-22 DIAGNOSIS — E03.9 ACQUIRED HYPOTHYROIDISM: ICD-10-CM

## 2024-03-04 ENCOUNTER — HOSPITAL ENCOUNTER (OUTPATIENT)
Facility: HOSPITAL | Age: 44
Discharge: HOME | End: 2024-03-07
Payer: COMMERCIAL

## 2024-03-04 VITALS — WEIGHT: 293 LBS | HEIGHT: 65 IN | BODY MASS INDEX: 48.82 KG/M2

## 2024-03-04 DIAGNOSIS — Z12.31 SCREENING MAMMOGRAM, ENCOUNTER FOR: ICD-10-CM

## 2024-03-04 PROCEDURE — 77063 BREAST TOMOSYNTHESIS BI: CPT

## 2024-04-17 RX ORDER — MECOBALAMIN 5000 MCG
TABLET,DISINTEGRATING ORAL
COMMUNITY
End: 2024-04-18

## 2024-04-17 RX ORDER — GABAPENTIN 300 MG/1
CAPSULE ORAL
COMMUNITY
Start: 2018-06-21 | End: 2024-04-18

## 2024-04-17 RX ORDER — LEVOTHYROXINE, LIOTHYRONINE 57; 13.5 UG/1; UG/1
TABLET ORAL
COMMUNITY
Start: 2024-02-23 | End: 2024-04-18

## 2024-04-17 RX ORDER — VITAMIN B COMPLEX
TABLET ORAL
COMMUNITY

## 2024-04-18 ENCOUNTER — TELEMEDICINE (OUTPATIENT)
Dept: PRIMARY CARE CLINIC | Facility: CLINIC | Age: 44
End: 2024-04-18
Payer: COMMERCIAL

## 2024-04-18 DIAGNOSIS — R73.03 PREDIABETES: ICD-10-CM

## 2024-04-18 DIAGNOSIS — E55.9 VITAMIN D DEFICIENCY, UNSPECIFIED: Chronic | ICD-10-CM

## 2024-04-18 DIAGNOSIS — E03.9 ACQUIRED HYPOTHYROIDISM: Chronic | ICD-10-CM

## 2024-04-18 PROCEDURE — 99214 OFFICE O/P EST MOD 30 MIN: CPT | Performed by: NURSE PRACTITIONER

## 2024-04-18 RX ORDER — PHENTERMINE AND TOPIRAMATE 3.75; 23 MG/1; MG/1
1 CAPSULE, EXTENDED RELEASE ORAL DAILY
Qty: 30 CAPSULE | Refills: 0 | Status: SHIPPED | OUTPATIENT
Start: 2024-04-18 | End: 2024-05-18

## 2024-04-18 SDOH — ECONOMIC STABILITY: FOOD INSECURITY: WITHIN THE PAST 12 MONTHS, THE FOOD YOU BOUGHT JUST DIDN'T LAST AND YOU DIDN'T HAVE MONEY TO GET MORE.: NEVER TRUE

## 2024-04-18 SDOH — ECONOMIC STABILITY: FOOD INSECURITY: WITHIN THE PAST 12 MONTHS, YOU WORRIED THAT YOUR FOOD WOULD RUN OUT BEFORE YOU GOT MONEY TO BUY MORE.: NEVER TRUE

## 2024-04-18 SDOH — ECONOMIC STABILITY: INCOME INSECURITY: HOW HARD IS IT FOR YOU TO PAY FOR THE VERY BASICS LIKE FOOD, HOUSING, MEDICAL CARE, AND HEATING?: NOT HARD AT ALL

## 2024-04-18 ASSESSMENT — PATIENT HEALTH QUESTIONNAIRE - PHQ9
SUM OF ALL RESPONSES TO PHQ QUESTIONS 1-9: 0
SUM OF ALL RESPONSES TO PHQ9 QUESTIONS 1 & 2: 0
SUM OF ALL RESPONSES TO PHQ QUESTIONS 1-9: 0
2. FEELING DOWN, DEPRESSED OR HOPELESS: NOT AT ALL
1. LITTLE INTEREST OR PLEASURE IN DOING THINGS: NOT AT ALL

## 2024-04-18 ASSESSMENT — ENCOUNTER SYMPTOMS
CHEST TIGHTNESS: 0
SHORTNESS OF BREATH: 0

## 2024-04-18 NOTE — PROGRESS NOTES
Chief Complaint   Patient presents with    Weight Loss     \"Have you been to the ER, urgent care clinic since your last visit?  Hospitalized since your last visit?\"    NO    “Have you seen or consulted any other health care providers outside of [facility] since your last visit?”    NO     “Have you had a pap smear?”    NO    No cervical cancer screening on file             Click Here for Release of Records Request

## 2024-04-18 NOTE — PROGRESS NOTES
Kathy Enriquez is a 43 y.o. female who was seen via telemedicine today 4/18/2024).    Assessment & Plan:   Below is the assessment and plan developed based on review of pertinent history, physical exam, labs, studies, and medications.    1. Body mass index (BMI) 50.0-59.9, adult (HCC)  Comments:  will start on qysmia in addition to lifestyle changes.   if not able to tolerate, will see to [facility] weight loss clinic.  Orders:  -     Phentermine-Topiramate (QSYMIA) 3.75-23 MG CP24; Take 1 tablet by mouth daily for 30 days. Max Daily Amount: 1 tablet, Disp-30 capsule, R-0Normal  -     CBC  -     Comprehensive Metabolic Panel  2. Acquired hypothyroidism  Comments:  stable on medication but experiencing fatigue.  Will check labs.  Orders:  -     TSH + Free T4 Panel  3. Vitamin D deficiency, unspecified  Comments:  history of same, will check vitamin D.  Orders:  -     Vitamin D 25 Hydroxy  4. Prediabetes  Comments:  will check a1c  Orders:  -     Hemoglobin A1C      No follow-ups on file.    Subjective:   Kathy was seen today for Weight Loss     Nutritional or dietetic assessment   Current dietary plan:  working on eating healthier, reducing sodas.   24 hour dietary recall: B: breakfast bar/tea L: salad at lunch D: Healthy dinner with more vegetables.  Drinking water but will have a soda some days.   Exercise: None due to meniscal tear currently  Length of time dietary changes have been made: Greater than 6 months  Medication Management:  Phentermine (lost weight)  Previous diets tried: keto, carranza, will lose the weight but then fall back into bad habits and gain the weight back.      Last Weight Metrics:      3/4/2024     2:08 PM 1/16/2024     4:58 PM 9/26/2023    10:26 AM 9/21/2023     7:35 AM 2/16/2023     9:20 AM 2/8/2023     9:10 AM 11/16/2022     7:19 AM   Weight Loss Metrics   Height 5' 5\" 5' 5\" 5' 5\" 5' 5\" 5' 5\" 5' 5\" 5' 5\"   Weight - Scale 316 lbs 316 lbs 315 lbs 6 oz 310 lbs 316 lbs 3 oz 315 lbs 8 oz 313

## 2024-04-19 ENCOUNTER — HOSPITAL ENCOUNTER (OUTPATIENT)
Facility: HOSPITAL | Age: 44
Discharge: HOME | End: 2024-04-22

## 2024-04-22 LAB
25(OH)D3+25(OH)D2 SERPL-MCNC: 24.1 NG/ML (ref 30–100)
BASOPHILS # BLD AUTO: 0.1 X10E3/UL (ref 0–0.2)
BASOPHILS NFR BLD AUTO: 1 %
EOSINOPHIL # BLD AUTO: 0.2 X10E3/UL (ref 0–0.4)
EOSINOPHIL NFR BLD AUTO: 3 %
ERYTHROCYTE [DISTWIDTH] IN BLOOD BY AUTOMATED COUNT: 13 % (ref 11.7–15.4)
HCT VFR BLD AUTO: 40.4 % (ref 34–46.6)
HGB BLD-MCNC: 13.1 G/DL (ref 11.1–15.9)
IMM GRANULOCYTES # BLD AUTO: 0 X10E3/UL (ref 0–0.1)
IMM GRANULOCYTES NFR BLD AUTO: 0 %
LYMPHOCYTES # BLD AUTO: 2.2 X10E3/UL (ref 0.7–3.1)
LYMPHOCYTES NFR BLD AUTO: 30 %
MCH RBC QN AUTO: 29.7 PG (ref 26.6–33)
MCHC RBC AUTO-ENTMCNC: 32.4 G/DL (ref 31.5–35.7)
MCV RBC AUTO: 92 FL (ref 79–97)
MONOCYTES # BLD AUTO: 0.6 X10E3/UL (ref 0.1–0.9)
MONOCYTES NFR BLD AUTO: 9 %
NEUTROPHILS # BLD AUTO: 4.2 X10E3/UL (ref 1.4–7)
NEUTROPHILS NFR BLD AUTO: 57 %
PLATELET # BLD AUTO: 216 X10E3/UL (ref 150–450)
RBC # BLD AUTO: 4.41 X10E6/UL (ref 3.77–5.28)
WBC # BLD AUTO: 7.3 X10E3/UL (ref 3.4–10.8)

## 2024-04-23 LAB
ALBUMIN SERPL-MCNC: 4.2 G/DL (ref 3.9–4.9)
ALBUMIN/GLOB SERPL: 1.6 {RATIO} (ref 1.2–2.2)
ALP SERPL-CCNC: 66 IU/L (ref 44–121)
ALT SERPL-CCNC: 27 IU/L (ref 0–32)
AST SERPL-CCNC: 31 IU/L (ref 0–40)
BILIRUB SERPL-MCNC: <0.2 MG/DL (ref 0–1.2)
BUN SERPL-MCNC: 9 MG/DL (ref 6–24)
BUN/CREAT SERPL: 13 (ref 9–23)
CALCIUM SERPL-MCNC: 9.6 MG/DL (ref 8.7–10.2)
CHLORIDE SERPL-SCNC: 102 MMOL/L (ref 96–106)
CO2 SERPL-SCNC: 17 MMOL/L (ref 20–29)
CREAT SERPL-MCNC: 0.67 MG/DL (ref 0.57–1)
EGFRCR SERPLBLD CKD-EPI 2021: 112 ML/MIN/1.73
GLOBULIN SER CALC-MCNC: 2.6 G/DL (ref 1.5–4.5)
GLUCOSE SERPL-MCNC: 98 MG/DL (ref 70–99)
HBA1C MFR BLD: 5.5 % (ref 4.8–5.6)
POTASSIUM SERPL-SCNC: 4.2 MMOL/L (ref 3.5–5.2)
PROT SERPL-MCNC: 6.8 G/DL (ref 6–8.5)
SODIUM SERPL-SCNC: 138 MMOL/L (ref 134–144)

## 2024-04-30 ENCOUNTER — PATIENT MESSAGE (OUTPATIENT)
Dept: PRIMARY CARE CLINIC | Facility: CLINIC | Age: 44
End: 2024-04-30

## 2024-05-01 RX ORDER — PHENTERMINE AND TOPIRAMATE 7.5; 46 MG/1; MG/1
1 CAPSULE, EXTENDED RELEASE ORAL DAILY
Qty: 90 CAPSULE | Refills: 0 | Status: SHIPPED | OUTPATIENT
Start: 2024-05-01 | End: 2024-07-30

## 2024-05-01 NOTE — TELEPHONE ENCOUNTER
From: Kathy Enriquez  To: Mattie Cahcon  Sent: 4/30/2024 10:21 AM EDT  Subject: Qsymia    Hi, you asked me to keep you updated on the progress with the new medication. I think it has been going well. I had a little sleepiness in the afternoons the first few days, and weirdly food tastes REALLY good (sad face) but I've been doing well eating better and my weight is 320 lbs this morning. I am feeling good about moving up to the next stage of the medicine if you are.    Also, I wanted to update my medical record- my dad had another bad dvt and so did his twin brother and they believe it's genetic-he's going to see Dr. Miller. 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

## 2024-05-08 LAB
CHOLEST SERPL-MCNC: 148 MG/DL
GLUCOSE SERPL-MCNC: 104 MG/DL (ref 65–100)
HDLC SERPL-MCNC: 57 MG/DL
LDLC SERPL CALC-MCNC: 63.6 MG/DL (ref 0–100)
TRIGL SERPL-MCNC: 137 MG/DL

## 2024-05-29 DIAGNOSIS — E03.9 ACQUIRED HYPOTHYROIDISM: ICD-10-CM

## 2024-09-05 DIAGNOSIS — E03.9 ACQUIRED HYPOTHYROIDISM: ICD-10-CM

## 2024-12-10 ENCOUNTER — OFFICE VISIT (OUTPATIENT)
Dept: PRIMARY CARE CLINIC | Facility: CLINIC | Age: 44
End: 2024-12-10
Payer: COMMERCIAL

## 2024-12-10 VITALS
WEIGHT: 293 LBS | HEART RATE: 102 BPM | HEIGHT: 65 IN | SYSTOLIC BLOOD PRESSURE: 132 MMHG | BODY MASS INDEX: 48.82 KG/M2 | TEMPERATURE: 98.5 F | RESPIRATION RATE: 16 BRPM | OXYGEN SATURATION: 98 % | DIASTOLIC BLOOD PRESSURE: 76 MMHG

## 2024-12-10 DIAGNOSIS — M53.3 COCCYX PAIN: ICD-10-CM

## 2024-12-10 DIAGNOSIS — E55.9 VITAMIN D DEFICIENCY, UNSPECIFIED: ICD-10-CM

## 2024-12-10 DIAGNOSIS — E03.9 ACQUIRED HYPOTHYROIDISM: Primary | ICD-10-CM

## 2024-12-10 DIAGNOSIS — M25.50 MULTIPLE JOINT PAIN: ICD-10-CM

## 2024-12-10 DIAGNOSIS — M24.9 HYPERMOBILE JOINTS: ICD-10-CM

## 2024-12-10 PROCEDURE — 99214 OFFICE O/P EST MOD 30 MIN: CPT | Performed by: NURSE PRACTITIONER

## 2024-12-10 SDOH — ECONOMIC STABILITY: FOOD INSECURITY: WITHIN THE PAST 12 MONTHS, THE FOOD YOU BOUGHT JUST DIDN'T LAST AND YOU DIDN'T HAVE MONEY TO GET MORE.: NEVER TRUE

## 2024-12-10 SDOH — ECONOMIC STABILITY: INCOME INSECURITY: HOW HARD IS IT FOR YOU TO PAY FOR THE VERY BASICS LIKE FOOD, HOUSING, MEDICAL CARE, AND HEATING?: NOT VERY HARD

## 2024-12-10 SDOH — ECONOMIC STABILITY: FOOD INSECURITY: WITHIN THE PAST 12 MONTHS, YOU WORRIED THAT YOUR FOOD WOULD RUN OUT BEFORE YOU GOT MONEY TO BUY MORE.: NEVER TRUE

## 2024-12-10 ASSESSMENT — ENCOUNTER SYMPTOMS
BACK PAIN: 1
SHORTNESS OF BREATH: 0
CHEST TIGHTNESS: 0

## 2024-12-10 ASSESSMENT — PATIENT HEALTH QUESTIONNAIRE - PHQ9
SUM OF ALL RESPONSES TO PHQ QUESTIONS 1-9: 0
SUM OF ALL RESPONSES TO PHQ QUESTIONS 1-9: 0
1. LITTLE INTEREST OR PLEASURE IN DOING THINGS: NOT AT ALL
SUM OF ALL RESPONSES TO PHQ9 QUESTIONS 1 & 2: 0
2. FEELING DOWN, DEPRESSED OR HOPELESS: NOT AT ALL
SUM OF ALL RESPONSES TO PHQ QUESTIONS 1-9: 0
SUM OF ALL RESPONSES TO PHQ QUESTIONS 1-9: 0

## 2024-12-10 NOTE — PROGRESS NOTES
Chief Complaint   Patient presents with    Medication Refill    Follow-up     HBP     BP (!) 142/86 (Site: Right Lower Arm, Position: Sitting)   Pulse (!) 102   Temp 98.5 °F (36.9 °C) (Oral)   Resp 16   Ht 1.651 m (5' 5\")   Wt (!) 137.9 kg (304 lb)   SpO2 98%   BMI 50.59 kg/m²     \"Have you been to the ER, urgent care clinic since your last visit?  Hospitalized since your last visit?\"    NO    “Have you seen or consulted any other health care providers outside our system since your last visit?”    NO     “Have you had a pap smear?”    NO    No cervical cancer screening on file

## 2024-12-10 NOTE — PROGRESS NOTES
Kathy Enriquez is a 44 y.o. female who was seen in clinic today (12/10/2024).    Assessment & Plan:   Below is the assessment and plan developed based on review of pertinent history, physical exam, labs, studies, and medications.    1. Acquired hypothyroidism  Comments:  will monitor thyroid labs  Orders:  -     TSH + Free T4 Panel  -     thyroid (ARMOUR THYROID) 180 MG tablet; Take 1 tablet by mouth every morning (before breakfast), Disp-90 tablet, R-3Normal  2. Vitamin D deficiency, unspecified  -     Vitamin D 25 Hydroxy  3. Multiple joint pain  Comments:  will check bladimir and inflammatory markers  Orders:  -     C-Reactive Protein  -     Sedimentation Rate  -     BLADIMIR, Direct, w/Reflex  4. Hypermobile joints  Comments:  if no abnormalities in labs, consisder seeing PMR specialist for evaluation  5. Coccyx pain  Comments:  xray taken today to evaluate and determine treatment pending results  Orders:  -     XR SACRUM COCCYX (MIN 2 VIEWS); Future      Return in about 6 months (around 6/10/2025) for Chronic OV.    Subjective:   Kathy was seen today for Medication Refill and Follow-up (HBP)     Joint pain: patient reported that over the past year she has been having increased joint pain in all joints.  When she gets home she has to lay down due to joint pain.  Patient reported she has hypermobile joints and velvet skin and she is concerned that she may have EDS.   Notes that she sprains her ankles often and has delayed wound healing.  Does have family history of lupus.    Patient hast tried NSAIDS and muscle relaxants in the past with some improvement in symptoms.  Thought to have fibromyalgia by previous PCP.      Concentration issue: Patient reported she is having trouble focus at work, will start multiple tasks and not complete tasks  that she starts.  Patient has not had a psychology evaluation or testing for ADHD.   No formal diagnosis in childhood but did have a  concerned that she had

## 2024-12-11 LAB
25(OH)D3+25(OH)D2 SERPL-MCNC: 19.8 NG/ML (ref 30–100)
ANA SER QL: NEGATIVE
CRP SERPL-MCNC: 4 MG/L (ref 0–10)
ERYTHROCYTE [SEDIMENTATION RATE] IN BLOOD BY WESTERGREN METHOD: 20 MM/HR (ref 0–32)
T4 FREE SERPL-MCNC: 1.29 NG/DL (ref 0.82–1.77)
TSH SERPL DL<=0.005 MIU/L-ACNC: 0.01 UIU/ML (ref 0.45–4.5)

## 2025-03-12 LAB
CHOLEST SERPL-MCNC: 204 MG/DL
GLUCOSE SERPL-MCNC: 110 MG/DL (ref 65–100)
HDLC SERPL-MCNC: 80 MG/DL
LDLC SERPL CALC-MCNC: 102 MG/DL (ref 0–100)
TRIGL SERPL-MCNC: 110 MG/DL

## 2025-04-22 DIAGNOSIS — E03.9 ACQUIRED HYPOTHYROIDISM: ICD-10-CM
